# Patient Record
Sex: FEMALE | Race: WHITE | NOT HISPANIC OR LATINO | Employment: FULL TIME | URBAN - METROPOLITAN AREA
[De-identification: names, ages, dates, MRNs, and addresses within clinical notes are randomized per-mention and may not be internally consistent; named-entity substitution may affect disease eponyms.]

---

## 2024-03-17 PROBLEM — E78.49 OTHER HYPERLIPIDEMIA: Status: ACTIVE | Noted: 2024-03-17

## 2024-03-17 PROBLEM — E03.8 SUBCLINICAL HYPOTHYROIDISM: Status: ACTIVE | Noted: 2024-03-17

## 2024-03-18 ENCOUNTER — LAB (OUTPATIENT)
Dept: LAB | Facility: LAB | Age: 59
End: 2024-03-18
Payer: COMMERCIAL

## 2024-03-18 ENCOUNTER — OFFICE VISIT (OUTPATIENT)
Dept: PRIMARY CARE | Facility: CLINIC | Age: 59
End: 2024-03-18
Payer: COMMERCIAL

## 2024-03-18 VITALS
DIASTOLIC BLOOD PRESSURE: 80 MMHG | SYSTOLIC BLOOD PRESSURE: 128 MMHG | OXYGEN SATURATION: 98 % | WEIGHT: 226.2 LBS | HEIGHT: 63 IN | BODY MASS INDEX: 40.08 KG/M2 | HEART RATE: 59 BPM

## 2024-03-18 DIAGNOSIS — E78.49 OTHER HYPERLIPIDEMIA: ICD-10-CM

## 2024-03-18 DIAGNOSIS — E03.8 SUBCLINICAL HYPOTHYROIDISM: ICD-10-CM

## 2024-03-18 DIAGNOSIS — Z00.00 WELL ADULT EXAM: Primary | ICD-10-CM

## 2024-03-18 DIAGNOSIS — Z12.31 OTHER SCREENING MAMMOGRAM: ICD-10-CM

## 2024-03-18 DIAGNOSIS — Z12.31 ENCOUNTER FOR SCREENING MAMMOGRAM FOR BREAST CANCER: ICD-10-CM

## 2024-03-18 DIAGNOSIS — Z00.00 WELL ADULT EXAM: ICD-10-CM

## 2024-03-18 DIAGNOSIS — R73.01 IFG (IMPAIRED FASTING GLUCOSE): Primary | ICD-10-CM

## 2024-03-18 LAB
ALBUMIN SERPL-MCNC: 4.4 G/DL (ref 3.5–5)
ALP BLD-CCNC: 89 U/L (ref 35–125)
ALT SERPL-CCNC: 13 U/L (ref 5–40)
ANION GAP SERPL CALC-SCNC: 11 MMOL/L
APPEARANCE UR: CLEAR
AST SERPL-CCNC: 17 U/L (ref 5–40)
BACTERIA #/AREA URNS AUTO: ABNORMAL /HPF
BASOPHILS # BLD AUTO: 0.06 X10*3/UL (ref 0–0.1)
BASOPHILS NFR BLD AUTO: 0.9 %
BILIRUB SERPL-MCNC: 0.4 MG/DL (ref 0.1–1.2)
BILIRUB UR STRIP.AUTO-MCNC: NEGATIVE MG/DL
BUN SERPL-MCNC: 24 MG/DL (ref 8–25)
CALCIUM SERPL-MCNC: 9.4 MG/DL (ref 8.5–10.4)
CAOX CRY #/AREA UR COMP ASSIST: ABNORMAL /HPF
CHLORIDE SERPL-SCNC: 103 MMOL/L (ref 97–107)
CHOLEST SERPL-MCNC: 208 MG/DL (ref 133–200)
CHOLEST/HDLC SERPL: 2.4 {RATIO}
CO2 SERPL-SCNC: 27 MMOL/L (ref 24–31)
COLOR UR: NORMAL
CREAT SERPL-MCNC: 0.8 MG/DL (ref 0.4–1.6)
EGFRCR SERPLBLD CKD-EPI 2021: 87 ML/MIN/1.73M*2
EOSINOPHIL # BLD AUTO: 0.15 X10*3/UL (ref 0–0.7)
EOSINOPHIL NFR BLD AUTO: 2.3 %
ERYTHROCYTE [DISTWIDTH] IN BLOOD BY AUTOMATED COUNT: 12.8 % (ref 11.5–14.5)
GLUCOSE SERPL-MCNC: 101 MG/DL (ref 65–99)
GLUCOSE UR STRIP.AUTO-MCNC: NORMAL MG/DL
HCT VFR BLD AUTO: 44.2 % (ref 36–46)
HDLC SERPL-MCNC: 86 MG/DL
HGB BLD-MCNC: 14 G/DL (ref 12–16)
IMM GRANULOCYTES # BLD AUTO: 0.01 X10*3/UL (ref 0–0.7)
IMM GRANULOCYTES NFR BLD AUTO: 0.2 % (ref 0–0.9)
KETONES UR STRIP.AUTO-MCNC: NEGATIVE MG/DL
LDLC SERPL CALC-MCNC: 110 MG/DL (ref 65–130)
LEUKOCYTE ESTERASE UR QL STRIP.AUTO: NEGATIVE
LYMPHOCYTES # BLD AUTO: 2.14 X10*3/UL (ref 1.2–4.8)
LYMPHOCYTES NFR BLD AUTO: 32.5 %
MCH RBC QN AUTO: 29.3 PG (ref 26–34)
MCHC RBC AUTO-ENTMCNC: 31.7 G/DL (ref 32–36)
MCV RBC AUTO: 93 FL (ref 80–100)
MONOCYTES # BLD AUTO: 0.54 X10*3/UL (ref 0.1–1)
MONOCYTES NFR BLD AUTO: 8.2 %
MUCOUS THREADS #/AREA URNS AUTO: ABNORMAL /LPF
NEUTROPHILS # BLD AUTO: 3.69 X10*3/UL (ref 1.2–7.7)
NEUTROPHILS NFR BLD AUTO: 55.9 %
NITRITE UR QL STRIP.AUTO: NEGATIVE
NRBC BLD-RTO: 0 /100 WBCS (ref 0–0)
PH UR STRIP.AUTO: 7 [PH]
PLATELET # BLD AUTO: 261 X10*3/UL (ref 150–450)
POTASSIUM SERPL-SCNC: 4.3 MMOL/L (ref 3.4–5.1)
PROT SERPL-MCNC: 6.9 G/DL (ref 5.9–7.9)
PROT UR STRIP.AUTO-MCNC: NORMAL MG/DL
RBC # BLD AUTO: 4.78 X10*6/UL (ref 4–5.2)
RBC # UR STRIP.AUTO: NEGATIVE /UL
RBC #/AREA URNS AUTO: ABNORMAL /HPF
SODIUM SERPL-SCNC: 141 MMOL/L (ref 133–145)
SP GR UR STRIP.AUTO: 1.03
SQUAMOUS #/AREA URNS AUTO: ABNORMAL /HPF
THYROPEROXIDASE AB SERPL-ACNC: 40 IU/ML
TRIGL SERPL-MCNC: 61 MG/DL (ref 40–150)
TSH SERPL DL<=0.05 MIU/L-ACNC: 4.03 MIU/L (ref 0.27–4.2)
UROBILINOGEN UR STRIP.AUTO-MCNC: NORMAL MG/DL
WBC # BLD AUTO: 6.6 X10*3/UL (ref 4.4–11.3)
WBC #/AREA URNS AUTO: ABNORMAL /HPF

## 2024-03-18 PROCEDURE — 36415 COLL VENOUS BLD VENIPUNCTURE: CPT

## 2024-03-18 PROCEDURE — 83036 HEMOGLOBIN GLYCOSYLATED A1C: CPT

## 2024-03-18 PROCEDURE — 81001 URINALYSIS AUTO W/SCOPE: CPT

## 2024-03-18 PROCEDURE — 99386 PREV VISIT NEW AGE 40-64: CPT | Performed by: FAMILY MEDICINE

## 2024-03-18 PROCEDURE — 84443 ASSAY THYROID STIM HORMONE: CPT

## 2024-03-18 PROCEDURE — 1036F TOBACCO NON-USER: CPT | Performed by: FAMILY MEDICINE

## 2024-03-18 PROCEDURE — 90471 IMMUNIZATION ADMIN: CPT | Performed by: FAMILY MEDICINE

## 2024-03-18 PROCEDURE — 90715 TDAP VACCINE 7 YRS/> IM: CPT | Performed by: FAMILY MEDICINE

## 2024-03-18 PROCEDURE — 80061 LIPID PANEL: CPT

## 2024-03-18 PROCEDURE — 3008F BODY MASS INDEX DOCD: CPT | Performed by: FAMILY MEDICINE

## 2024-03-18 PROCEDURE — 80053 COMPREHEN METABOLIC PANEL: CPT

## 2024-03-18 PROCEDURE — 86376 MICROSOMAL ANTIBODY EACH: CPT

## 2024-03-18 PROCEDURE — 85025 COMPLETE CBC W/AUTO DIFF WBC: CPT

## 2024-03-18 ASSESSMENT — ENCOUNTER SYMPTOMS
COUGH: 0
NAUSEA: 0
DYSURIA: 0
UNEXPECTED WEIGHT CHANGE: 0
NERVOUS/ANXIOUS: 0
VOMITING: 0
SHORTNESS OF BREATH: 0
CHILLS: 0
ARTHRALGIAS: 0
WEAKNESS: 0
DYSPHORIC MOOD: 0
MYALGIAS: 0
SORE THROAT: 0
ABDOMINAL PAIN: 0
NUMBNESS: 0
BLOOD IN STOOL: 0
RHINORRHEA: 0
HEMATURIA: 0
DIZZINESS: 0
FEVER: 0
TROUBLE SWALLOWING: 0

## 2024-03-18 ASSESSMENT — PATIENT HEALTH QUESTIONNAIRE - PHQ9
1. LITTLE INTEREST OR PLEASURE IN DOING THINGS: NOT AT ALL
SUM OF ALL RESPONSES TO PHQ9 QUESTIONS 1 AND 2: 0
2. FEELING DOWN, DEPRESSED OR HOPELESS: NOT AT ALL

## 2024-03-18 ASSESSMENT — PAIN SCALES - GENERAL: PAINLEVEL: 0-NO PAIN

## 2024-03-18 NOTE — PROGRESS NOTES
Subjective   Patient ID: Lupe Schmidt is a 58 y.o. female who presents for Establish Care and Weight Loss.    HPI   Lupe is seen for comprehensive physical exam.  PMH, PSH, family history and social history were reviewed and updated.  GYN - Last period was 2020, Pap 2021 negative and negative  Subclinical hypothyroidism -at 1 point she was taking thyroid replacement medication but did not feel any better on it and therefore just stopped.  She has not had her thyroid rechecked in 3 years.  Hypercholesterolemia-she has been eating a clean whole food diet for the past year and a half.  Initially lost 30 pounds but has been unable to lose any more weight.  She is very frustrated.    Review of Systems   Constitutional:  Negative for chills, fever and unexpected weight change.   HENT:  Negative for congestion, ear pain, hearing loss, rhinorrhea, sore throat and trouble swallowing.    Eyes:  Negative for visual disturbance.   Respiratory:  Negative for cough and shortness of breath.    Cardiovascular:  Negative for chest pain and leg swelling.   Gastrointestinal:  Negative for abdominal pain, blood in stool, nausea and vomiting.   Genitourinary:  Negative for dysuria, hematuria, vaginal bleeding and vaginal discharge.   Musculoskeletal:  Negative for arthralgias and myalgias.   Skin:  Negative for rash.   Neurological:  Negative for dizziness, weakness and numbness.   Psychiatric/Behavioral:  Negative for dysphoric mood. The patient is not nervous/anxious.        Objective   /80   Pulse 59   Wt 103 kg (226 lb 3.2 oz)   SpO2 98%   BMI 40.07 kg/m²     Physical Exam  Vitals and nursing note reviewed.   Constitutional:       General: She is not in acute distress.  HENT:      Right Ear: Tympanic membrane and ear canal normal.      Left Ear: Tympanic membrane and ear canal normal.      Nose: Nose normal.      Mouth/Throat:      Mouth: Mucous membranes are moist.   Eyes:      Extraocular Movements: Extraocular  movements intact.      Pupils: Pupils are equal, round, and reactive to light.   Neck:      Vascular: No carotid bruit.   Cardiovascular:      Rate and Rhythm: Normal rate and regular rhythm.   Pulmonary:      Effort: Pulmonary effort is normal.      Breath sounds: Normal breath sounds.   Chest:   Breasts:     Right: Normal. No mass or nipple discharge.      Left: Normal. No mass or nipple discharge.   Abdominal:      General: Abdomen is flat.      Palpations: Abdomen is soft.      Tenderness: There is no abdominal tenderness.   Musculoskeletal:         General: Normal range of motion.   Lymphadenopathy:      Cervical: No cervical adenopathy.      Upper Body:      Right upper body: No supraclavicular or axillary adenopathy.      Left upper body: No supraclavicular or axillary adenopathy.   Skin:     General: Skin is warm.      Findings: No rash.   Neurological:      General: No focal deficit present.      Mental Status: She is alert.   Psychiatric:         Mood and Affect: Mood normal.         Assessment/Plan   Problem List Items Addressed This Visit             ICD-10-CM    Subclinical hypothyroidism - unclear etiology and thus will check antibody as well as thyroid function E03.8    Relevant Orders    TSH with reflex to Free T4 if abnormal    Thyroid Peroxidase (TPO) Antibody    Other hyperlipidemia - continue with diet and exercise E78.49    Relevant Orders    Lipid Panel     Other Visit Diagnoses         Codes    Well adult exam    -  Primary  Preventative measures discussed in detail.  Immunizations reviewed and updated.  Reviewed labs orders with patient Z00.00    Relevant Orders    Lipid Panel    Comprehensive Metabolic Panel    CBC and Auto Differential    TSH with reflex to Free T4 if abnormal    Urinalysis with Reflex Microscopic    Thyroid Peroxidase (TPO) Antibody    Encounter for screening mammogram for breast cancer     Z12.31    Relevant Orders    BI mammo bilateral screening tomosynthesis     Z12.31     BMI 40.0-44.9, adult (CMS/HCC)    Indications, benefits and potential side effects of medication discussed in detail.  Continue with dietary changes and exercise Z68.41    Relevant Medications    tirzepatide, weight loss, (Zepbound) 5 mg/0.5 mL injection     Follow up in 1 month to discuss dose adjustment

## 2024-03-19 LAB
EST. AVERAGE GLUCOSE BLD GHB EST-MCNC: 111 MG/DL
HBA1C MFR BLD: 5.5 %

## 2024-03-21 ENCOUNTER — TELEPHONE (OUTPATIENT)
Dept: PRIMARY CARE | Facility: CLINIC | Age: 59
End: 2024-03-21
Payer: COMMERCIAL

## 2024-03-25 ENCOUNTER — TELEPHONE (OUTPATIENT)
Dept: PRIMARY CARE | Facility: CLINIC | Age: 59
End: 2024-03-25

## 2024-03-25 NOTE — TELEPHONE ENCOUNTER
PT called to check on the prior authorization status for Zepbound. PT stated the pharmacy said her insurance wouldn't approve it without her first trying a different medication. Patient doesn't remember the name of the medication.    Patient can be reached at [phone]

## 2024-06-12 PROCEDURE — 88175 CYTOPATH C/V AUTO FLUID REDO: CPT

## 2024-06-12 PROCEDURE — 87624 HPV HI-RISK TYP POOLED RSLT: CPT

## 2024-06-14 ENCOUNTER — HOSPITAL ENCOUNTER (OUTPATIENT)
Dept: RADIOLOGY | Facility: CLINIC | Age: 59
Discharge: HOME | End: 2024-06-14
Payer: COMMERCIAL

## 2024-06-14 ENCOUNTER — LAB REQUISITION (OUTPATIENT)
Dept: LAB | Facility: HOSPITAL | Age: 59
End: 2024-06-14
Payer: COMMERCIAL

## 2024-06-14 DIAGNOSIS — Z12.4 ENCOUNTER FOR SCREENING FOR MALIGNANT NEOPLASM OF CERVIX: ICD-10-CM

## 2024-06-14 DIAGNOSIS — Z11.51 ENCOUNTER FOR SCREENING FOR HUMAN PAPILLOMAVIRUS (HPV): ICD-10-CM

## 2024-06-14 DIAGNOSIS — Z13.820 ENCOUNTER FOR SCREENING FOR OSTEOPOROSIS: ICD-10-CM

## 2024-06-14 DIAGNOSIS — Z01.419 ENCOUNTER FOR GYNECOLOGICAL EXAMINATION (GENERAL) (ROUTINE) WITHOUT ABNORMAL FINDINGS: ICD-10-CM

## 2024-06-14 PROCEDURE — 77080 DXA BONE DENSITY AXIAL: CPT | Performed by: RADIOLOGY

## 2024-06-14 PROCEDURE — 77080 DXA BONE DENSITY AXIAL: CPT

## 2024-06-15 ENCOUNTER — LAB (OUTPATIENT)
Dept: LAB | Facility: LAB | Age: 59
End: 2024-06-15
Payer: COMMERCIAL

## 2024-06-15 ENCOUNTER — HOSPITAL ENCOUNTER (OUTPATIENT)
Dept: RADIOLOGY | Facility: CLINIC | Age: 59
Discharge: HOME | End: 2024-06-15
Payer: COMMERCIAL

## 2024-06-15 VITALS — BODY MASS INDEX: 39.87 KG/M2 | HEIGHT: 63 IN | WEIGHT: 225 LBS

## 2024-06-15 DIAGNOSIS — N95.1 MENOPAUSAL AND FEMALE CLIMACTERIC STATES: Primary | ICD-10-CM

## 2024-06-15 DIAGNOSIS — Z12.31 ENCOUNTER FOR SCREENING MAMMOGRAM FOR MALIGNANT NEOPLASM OF BREAST: ICD-10-CM

## 2024-06-15 LAB — PROGEST SERPL-MCNC: <0.3 NG/ML

## 2024-06-15 PROCEDURE — 77067 SCR MAMMO BI INCL CAD: CPT | Performed by: RADIOLOGY

## 2024-06-15 PROCEDURE — 82670 ASSAY OF TOTAL ESTRADIOL: CPT

## 2024-06-15 PROCEDURE — 77067 SCR MAMMO BI INCL CAD: CPT

## 2024-06-15 PROCEDURE — 84144 ASSAY OF PROGESTERONE: CPT

## 2024-06-15 PROCEDURE — 82681 ASSAY DIR MEAS FR ESTRADIOL: CPT

## 2024-06-15 PROCEDURE — 82677 ASSAY OF ESTRIOL: CPT

## 2024-06-15 PROCEDURE — 36415 COLL VENOUS BLD VENIPUNCTURE: CPT

## 2024-06-15 PROCEDURE — 77063 BREAST TOMOSYNTHESIS BI: CPT | Performed by: RADIOLOGY

## 2024-06-15 PROCEDURE — 84402 ASSAY OF FREE TESTOSTERONE: CPT

## 2024-06-20 LAB
ESTRIOL SERPL-MCNC: <0.1 NG/ML
TESTOSTERONE FREE (CHAN): 1.6 PG/ML (ref 0.1–6.4)
TESTOSTERONE,TOTAL,LC-MS/MS: 14 NG/DL (ref 2–45)

## 2024-06-24 LAB
ESTRADIOL (SJC): 9 PG/ML
ESTRADIOL FREE: 0.16 PG/ML

## 2024-10-04 ENCOUNTER — APPOINTMENT (OUTPATIENT)
Dept: GASTROENTEROLOGY | Facility: CLINIC | Age: 59
End: 2024-10-04
Payer: COMMERCIAL

## 2024-11-20 ENCOUNTER — TELEPHONE (OUTPATIENT)
Dept: PRIMARY CARE | Facility: CLINIC | Age: 59
End: 2024-11-20
Payer: COMMERCIAL

## 2024-11-20 NOTE — TELEPHONE ENCOUNTER
Patient was told back in March 2024 to start Zepbound for weight loss, her insurance would not cover it so she has not done anything about it. She would like to know if there is another option for weight loss to explore? Would DDC like patient to schedule an appointment to further discuss? Patient declined an appointment , wanted a message sent first to see what DDC suggested.

## 2024-12-18 ENCOUNTER — APPOINTMENT (OUTPATIENT)
Dept: GASTROENTEROLOGY | Facility: CLINIC | Age: 59
End: 2024-12-18
Payer: COMMERCIAL

## 2025-01-17 ENCOUNTER — APPOINTMENT (OUTPATIENT)
Dept: PRIMARY CARE | Facility: CLINIC | Age: 60
End: 2025-01-17
Payer: COMMERCIAL

## 2025-01-17 VITALS
BODY MASS INDEX: 41.99 KG/M2 | OXYGEN SATURATION: 98 % | HEIGHT: 63 IN | SYSTOLIC BLOOD PRESSURE: 124 MMHG | WEIGHT: 237 LBS | DIASTOLIC BLOOD PRESSURE: 82 MMHG | HEART RATE: 74 BPM

## 2025-01-17 DIAGNOSIS — E78.49 OTHER HYPERLIPIDEMIA: ICD-10-CM

## 2025-01-17 PROBLEM — E66.9 OBESITY WITH BODY MASS INDEX 30 OR GREATER: Status: RESOLVED | Noted: 2025-01-17 | Resolved: 2025-01-17

## 2025-01-17 PROCEDURE — 99213 OFFICE O/P EST LOW 20 MIN: CPT | Performed by: FAMILY MEDICINE

## 2025-01-17 PROCEDURE — 3008F BODY MASS INDEX DOCD: CPT | Performed by: FAMILY MEDICINE

## 2025-01-17 RX ORDER — ESTRADIOL 1 MG/1
1 TABLET ORAL
COMMUNITY
Start: 2024-10-16

## 2025-01-17 RX ORDER — PROGESTERONE 100 MG/1
CAPSULE ORAL
COMMUNITY
Start: 2024-10-16

## 2025-01-17 ASSESSMENT — PAIN SCALES - GENERAL: PAINLEVEL_OUTOF10: 0-NO PAIN

## 2025-01-17 ASSESSMENT — PATIENT HEALTH QUESTIONNAIRE - PHQ9
SUM OF ALL RESPONSES TO PHQ9 QUESTIONS 1 AND 2: 0
1. LITTLE INTEREST OR PLEASURE IN DOING THINGS: NOT AT ALL
2. FEELING DOWN, DEPRESSED OR HOPELESS: NOT AT ALL

## 2025-01-17 ASSESSMENT — COLUMBIA-SUICIDE SEVERITY RATING SCALE - C-SSRS: 1. IN THE PAST MONTH, HAVE YOU WISHED YOU WERE DEAD OR WISHED YOU COULD GO TO SLEEP AND NOT WAKE UP?: NO

## 2025-01-17 NOTE — PROGRESS NOTES
"Subjective   Patient ID: Lupe Schmidt is a 59 y.o. female who presents for Weight Gain.    HPI  Lupe presents complaining of obesity and weight gain.  Very frustrated.  Tried to get on Zepbound however it is not covered by her insurance and the cost is a deterrent.  She has been trying to focus on diet and exercise.  Would like to consider other options.    Review of Systems  All other systems have been reviewed and are negative except as noted in the HPI.     Objective   Vital Signs:  /82   Pulse 74   Ht 1.6 m (5' 3\")   Wt 108 kg (237 lb)   SpO2 98%   BMI 41.98 kg/m²   Wt Readings from Last 3 Encounters:   01/17/25 108 kg (237 lb)   06/15/24 102 kg (225 lb)   03/18/24 103 kg (226 lb 3.2 oz)       Physical Exam  Alert.  No acute distress.  Judgment and insight intact.  Assessment & Plan  BMI 40.0-44.9, adult (Multi)  Weight has increased over the past 6 months despite an increase in focus and desire to become healthier.  Given that her insurance does not cover medications recommend referral to bariatric center to discuss other options including gastric sleeve versus gastric bypass.  Strongly encouraged to continue with diet and exercise.  Orders:    Referral to Bariatric Surgery; Future    Other hyperlipidemia  Total cholesterol 208.  Reduction of fatty food intake discussed.  Stressed the importance of weight loss.  Due for recheck in March.  Orders:    Referral to Bariatric Surgery; Future        Follow up 3 Months, sooner with any problems or concerns.  "

## 2025-01-21 NOTE — ASSESSMENT & PLAN NOTE
Total cholesterol 208.  Reduction of fatty food intake discussed.  Stressed the importance of weight loss.  Due for recheck in March.  Orders:    Referral to Bariatric Surgery; Future

## 2025-01-24 DIAGNOSIS — Z98.84 BARIATRIC SURGERY STATUS: ICD-10-CM

## 2025-01-31 ENCOUNTER — APPOINTMENT (OUTPATIENT)
Dept: SURGERY | Facility: CLINIC | Age: 60
End: 2025-01-31
Payer: COMMERCIAL

## 2025-01-31 VITALS — WEIGHT: 237 LBS | BODY MASS INDEX: 41.99 KG/M2 | HEIGHT: 63 IN

## 2025-01-31 NOTE — PROGRESS NOTES
"Initial Bariatric Nutrition Assessment    Surgeon: Asiya   Patient is considering: sleeve gastrectomy    ASSESSMENT:  Current weight:   Vitals:    01/31/25 1409   Weight: 108 kg (237 lb)    Ht:  1.6 m (5' 3\")  BMI: Body mass index is 41.98 kg/m².        Initial start weight in pounds: 237.0  1/31/25  Pre-Op Excess Body Weight (EBW) in pounds: 96.0  Target Post-Op weight goal in pounds: 174.6 - 187.0    This is a 59 y.o. female with morbid obesity (Body mass index is 41.98 kg/m².) who presents to clinic for consideration of bariatric surgery. The patient has attempted and failed multiple diet and exercise regimens for weight loss.   Goal for surgery is to lower weight for overall health.      Food allergies/intolerances: no  Chewing/Swallowing/Dentition: no  Nausea / Vomiting / Hx Gastroparesis: no  Diarrhea/ Constipation: no  Smoking/Tobacco use: no  Vitamins/Minerals supplements: B12 1000 mcg. on own for energy  Hours of sleep/night: 6-7  Exercise: 2-4x/wk for 50 min. mostly resistance and some cardio  Previous Weight loss Attempts: WW, \"Trim Healthy Mama\" and exercise    Medications:     Current Outpatient Medications:     estradiol (Estrace) 1 mg tablet, Take 1 tablet (1 mg) by mouth early in the morning.., Disp: , Rfl:     progesterone (Prometrium) 100 mg capsule, take one capsule by mouth once a day at bedtime, Disp: , Rfl:     24 HOUR RECALL/DIET HISTORY:  Breakfast:  Optimized Whey protein powder 70 cals and 16 grams protein), unsweetened cashew milk, okra, spices  Snack: no   Lunch: salad with baked chicken, beets balsamic dressing  Snack: Built protein bar 130 cals; 4 grams sugar and 17 grams protein  Dinner: grilled chicken sandwich with sauce and cup chicken tortilla soup  Snack: no  Beverages: water, green tea with stevia, diet pop  Alcohol: rarely    Person responsible for cooking & shopping? self  How often do you eat sweet snacks? eating no sugar added varieties daily  How often do you eat savory " snacks? 2x/month  How often do you eat out? 1-3x/wk  Do you feel overly stuffed? occasionally    Binge Eating? no   Night Eating? no  Emotional Eating? boredom     READINESS TO LEARN:  Motivation to learn: Interested        Understanding of instruction: Good  Anticipated Compliance: Good  Family Support: spouse    Educational Materials Provided:    Pre-op Diet and meal plan                                          Nutrition Guidelines for Gastric Bypass and Sleeve Gastrectomy   Schedules for MSWL class and support group   Goals sheet    Nutrition assessment completed today.  Pt will be scheduled for video education class to discuss the 2 week pre op diet, post op protein and fluid goals, vitamin and mineral supplementation, exercise goals, and post op diet progression closer to the time of surgery.    Instructed pt on a 1400 calorie meal plan and encouraged patient  to measure and record intake daily.  Advised to eat 3 meals and 1-2 high protein or produce based snacks daily.   Reviewed postop behaviors to start practicing.   Set a goal to start doing regular exercise.    Patient was receptive to nutritional recommendations, asked numerous questions, and verbalized understanding of the weight loss surgery diet.  Patient expressed understanding about the importance of strict dietary compliance post-surgery to avoid nutritional deficiencies and achieve optimal weight loss and verbalized intent to follow dietary recommendations.    Malnutrition Screening: n/a  Significant unintentional weight loss? n/a   Eating less than 75% of usual intake for more than 2 weeks? n/a      Nutrition Diagnosis:   Overweight/obesity related to excess energy intake as evidenced by BMI >= 40 kg/m^2.  Food- and nutrition-related knowledge deficit related to lack of prior exposure to surgical weight loss information as evidenced by pt new to surgical program.    Nutrition Interventions:   Modify type and amount of food and nutrients within  meals and snacks.  Comprehensive Nutrition Education    Recommendations:  1. Begin following your meal plan.  Measure and record intake daily.   2. Structure meal patterns, eating three meals and 1-2 snacks per day.  3. Aim for 15-30 grams protein per meal.  Have 1-2 high protein snacks that are 10-20 g protein each.  You can try a tuna or chicken packet, Greek yogurt, 2 string cheeses, Protein bars like Quest, Pure Protein, Premier, or Built Bars. you can also try protein chips form Quest or Atkins.    4. Drink 64oz of calorie-free, caffeine-free, and non-carbonated beverages.   5. Practice no drinking 30 minutes before meals, nothing with meals and wait 30 minutes after meals to drink again. Make meals last 30 minutes-chew thoroughly.   6. Limit or omit eating out/sweets/savory snacks to 1-2 times per week.  7. Begin daily multivitamin that is not a gummy multivitamin.  8. Increase physical activity by 10-15 minutes as tolerated to an end goal of 60 minutes 5 x per week. Consistency is the key.  Pre-op Goal weight: lose 5% of body weight    Nutrition Monitoring and Evaluation: 1-2 pound weight loss per week  Criteria: weight check  Need for Follow-up: in 1 month    Patient does meet [employer] guidelines for weight loss surgery, however needs to demonstrate consistent effort in making dietary changes before giving clearance. It is anticipated that the patient will need at least 1 nutritional follow-up visits prior to clearance for surgery.

## 2025-02-27 ENCOUNTER — APPOINTMENT (OUTPATIENT)
Dept: SURGERY | Facility: CLINIC | Age: 60
End: 2025-02-27
Payer: COMMERCIAL

## 2025-02-27 VITALS — BODY MASS INDEX: 41.64 KG/M2 | HEIGHT: 63 IN | WEIGHT: 235 LBS

## 2025-02-27 NOTE — PROGRESS NOTES
"PREOPERATIVE, MULTIDISCIPLINARY, MEDICALLY SUPERVISED, REDUCED CALORIE DIET, BEHAVIOR MODIFICATION AND EXERCISE PROGRAM    S: The patient has been working on modifying her diet.  24 hour food recall: B: shake with whey protein, unsweetened Kefir, unsweetened cashew milk and okra; L: chicken sausage with brussels sprouts; D: hamburger on bun with corned beef, sauerkraut, cheese, 1000 island dressing    O:    Vitals:    02/27/25 1135   Weight: 107 kg (235 lb)    Ht:   1.6 m (5' 3\")     BMI: Body mass index is 41.63 kg/m².    Goal: 5% body weight loss over the course of program    Dietary recommendation:   1. Continue to drink at least 64 ounces of water and calorie free, non-carbonated, and decaffeinated beverages  2. Practice the 30-30-30 rule by drinking between meals.  3. Structure your meal plan - have 3 meals and 1 snack daily.  4. Have balanced meals that always contain a good source of protein.  5. Increase intake of non-starchy vegetables.  Have 5 servings fruits and vegetables daily.   6. Continue to take a multivitamin daily.  7. Increase physical activity by 10-15 minutes to an end goal of 60 minutes 5 x per week.    Group Topic: Portion Control   Behavioral recommendation: Pt is encouraged to identify and use the appropriate serving sizes from each food group.    A/P: Pt appears to have a good understanding of how to incorporate appropriate portion sizes into their daily meal pattern.  Pt has a goal to begin weighing and measuring portions until able to visualize the appropriate portion size.    Exercise: exercising at gym 4x/wk for 50 min; 2x/wk aerobic and 2x/wk resistance exercise    Follow-up in 1 month    Sarah Gonzalez RD, LD  "

## 2025-03-03 ENCOUNTER — APPOINTMENT (OUTPATIENT)
Dept: SLEEP MEDICINE | Facility: CLINIC | Age: 60
End: 2025-03-03
Payer: COMMERCIAL

## 2025-03-03 VITALS
DIASTOLIC BLOOD PRESSURE: 80 MMHG | SYSTOLIC BLOOD PRESSURE: 128 MMHG | HEIGHT: 63 IN | WEIGHT: 236.2 LBS | BODY MASS INDEX: 41.85 KG/M2 | OXYGEN SATURATION: 98 %

## 2025-03-03 DIAGNOSIS — E66.01 MORBID OBESITY (MULTI): ICD-10-CM

## 2025-03-03 DIAGNOSIS — Z01.818 PREOPERATIVE CLEARANCE: ICD-10-CM

## 2025-03-03 DIAGNOSIS — Z98.84 BARIATRIC SURGERY STATUS: Primary | ICD-10-CM

## 2025-03-03 PROCEDURE — 1036F TOBACCO NON-USER: CPT | Performed by: PSYCHIATRY & NEUROLOGY

## 2025-03-03 PROCEDURE — 99204 OFFICE O/P NEW MOD 45 MIN: CPT | Performed by: PSYCHIATRY & NEUROLOGY

## 2025-03-03 PROCEDURE — 3008F BODY MASS INDEX DOCD: CPT | Performed by: PSYCHIATRY & NEUROLOGY

## 2025-03-03 RX ORDER — PROGESTERONE 200 MG/1
200 CAPSULE ORAL DAILY
COMMUNITY
Start: 2024-12-18

## 2025-03-03 NOTE — PROGRESS NOTES
Patient: Lupe Schmidt    [id number]  : 1965 -- AGE 59 y.o.    Provider: Philip Moise MD     [facility]   Service Date: 3/3/2025              [facility]  New Visit Note          The patient's referring provider is: Lavon Livingston MD    HPI:  Lupe Schmidt is a 59 y.o. female with subclinical hypothyroidism, hyperlipidemia, and BMI 40-44.9, who presents today for bariatric surgery clearance.    She has no subjective sleep complaints. States that she snores a little if supine, started a few years ago, mild in intensity, and worsening over the years. States her  she can alternate breathing in sleep with a shallow breath and a normal/bigger breath. She is unsure how often this happens    NIGHTTIME SYMPTOMS:   Snoring: as above  Witnessed apnea: No  Nocturnal gasping: No  Nocturnal choking: No  Sleep walking: No  Sleep talking:  No  Dream enactment: No  Bruxism: No  Nocturnal excessive sweating: occasional - improved with hormone medication  Nocturnal GERD: No  Morning headaches: No  Morning dry mouth/sore throat: No  Nocturia: rare  Restless sleep: No  Sleep paralysis: No  Hypnagogic/hypnopompic hallucinations: No    DAYTIME SYMPTOMS  Daytime sleepiness: generally no  Fatigue: No  Trouble with memory/concentration: No  Feeling sleepy while driving: Denies    RLS symptoms: No.   Cataplexy: No    SLEEP HABITS:   Preferred sleep position: side or supine  Bedtime: on weekdays 10 pm, asleep by 1015 pm, on weekends bedtime is 10-11 pm, asleep within 15 min  Wake time: on weekdays 5 am and on weekends 7 am  Napping: occasional on weekends for 1-2 hours  Total estimated sleep per 24 hrs: 6-8 hours      Patient Active Problem List   Diagnosis    Subclinical hypothyroidism    Other hyperlipidemia    BMI 40.0-44.9, adult (Multi)     Past Medical History:   Diagnosis Date    Obesity with body mass index 30 or greater 2025    Varicella      Past  "Surgical History:   Procedure Laterality Date     SECTION, LOW TRANSVERSE  , ,     COLONOSCOPY      EYE SURGERY  LASIK     WISDOM TOOTH EXTRACTION  1985     Current Outpatient Medications   Medication Sig Dispense Refill    NON FORMULARY DHEA, ESTRADIOL, PROGESTERONE CREAM      progesterone (Prometrium) 200 mg capsule Insert 1 capsule (200 mg) into the vagina once daily.       No current facility-administered medications for this visit.     Allergies   Allergen Reactions    Sulfa (Sulfonamide Antibiotics) Shortness of breath       FAMILY HISTORY OF SLEEP DISORDERS: none that she knows of  Family History   Problem Relation Name Age of Onset    Arthritis Mother DEMETRIA GUARDADO     Alcohol abuse Father AASHISH MONTGOMERY, SR        SOCIAL HISTORY  Employment: works at machine shop  Lives with:  and 26 yo autistic son  Alcohol: rare  Cigarettes: never  Illicits: no  Caffeine: up to 1 per day     ROS: 12 point ROS is negative for all items/systems.    PHYSICAL EXAMINATION:   Vitals:    25 0819   BP: 128/80   BP Location: Left arm   BP Cuff Size: Adult   SpO2: 98%   Weight: 107 kg (236 lb 3.2 oz)   Height: 1.6 m (5' 3\")     Body mass index is 41.84 kg/m².  General: Awake. Alert. Comfortable. No apparent distress.   Speech: Normal  Comprehension: Normal  Mood: Stable  Affect: Appropriate  Eyes:   Eyelids: normal            ENT:          Nares are patent bilaterally. Septum deviation absent. Lisa tongue position I. Tongue scalloping is present, tongue is mildly enlarged, soft palate is not elongated, hard palate is not high arched. Uvula is not enlarged. Retrognathia is not present. Tonsils are not enlarged. Dentition excellent.           Neck:          Circumference: mildly increased in caliber  Cardiac: Regular in rate and rhythm. No murmurs. No edema in bilateral lower extremities.  Pul:         Clear to auscultation bilaterally. Normal respiratory effort   Abd:        mildly " obese  Neuro: Alert, well-oriented. Cranial nerves II-XII grossly normal and symmetric.  Moves all limbs symmetrically with no evidence of significant focal weakness. No abnormal movements noted. Normal gait        LABS/DIAGNOSTICS:  Lab Results   Component Value Date    HGB 14.0 03/18/2024    CO2 27 03/18/2024    TSH 4.03 03/18/2024    FREET4 1.0 01/07/2021    HGBA1C 5.5 03/18/2024        Echo: none  PFTs: none      ASSESSMENT AND PLAN: Ms. Lupe Schmidt is a 59 y.o. female who is going through the bariatric surgery process. She states she does not snore, but sometimes has shallow breathing during sleep that can be followed by a bigger or normal breath. Due to the potential for increased perioperative risks in the bariatric surgery population, testing for sleep apnea and treating if needed prior to surgery is recommended.    #morbid obesity  #preoperative clearance  #bariatric surgery status  -We discussed the risk factors for sleep apnea, pathophysiology of sleep apnea, treatment options, and potential long-term complications of untreated SILVA, including cardiovascular and metabolic complications. We will start evaluation with a home sleep test.   -clearance depends on sleep study results and if CPAP compliance is needed      All of the above was discussed with the patient in detail. She voiced an understanding of the above and was agreeable to proceed further as advised. Procedure for the sleep study was discussed with her.      FOLLOW UP:  After study to discuss results

## 2025-03-07 ENCOUNTER — TELEPHONE (OUTPATIENT)
Dept: SURGERY | Facility: CLINIC | Age: 60
End: 2025-03-07
Payer: COMMERCIAL

## 2025-03-07 NOTE — TELEPHONE ENCOUNTER
Thank you for speaking with me earlier today & confirming your scheduled visit with Dr. Braden on 3/19/25 at 1:00 pm at [facility] (inside [facility], main floor in the [facility]).  Please arrive by 11:45 am to attend the New Patient class.  Parking is available at a cost of $5.00 at the Main Entrance.  We look forward to seeing you!  Polina Geronimo RN, Clinic Nurse

## 2025-03-09 ENCOUNTER — PROCEDURE VISIT (OUTPATIENT)
Dept: SLEEP MEDICINE | Facility: HOSPITAL | Age: 60
End: 2025-03-09
Payer: COMMERCIAL

## 2025-03-09 DIAGNOSIS — Z98.84 BARIATRIC SURGERY STATUS: ICD-10-CM

## 2025-03-09 DIAGNOSIS — E66.01 MORBID OBESITY (MULTI): ICD-10-CM

## 2025-03-09 DIAGNOSIS — Z01.818 PREOPERATIVE CLEARANCE: ICD-10-CM

## 2025-03-09 PROCEDURE — 95806 SLEEP STUDY UNATT&RESP EFFT: CPT | Performed by: INTERNAL MEDICINE

## 2025-03-19 ENCOUNTER — APPOINTMENT (OUTPATIENT)
Dept: SURGERY | Facility: CLINIC | Age: 60
End: 2025-03-19
Payer: COMMERCIAL

## 2025-03-19 VITALS
BODY MASS INDEX: 41.66 KG/M2 | HEART RATE: 89 BPM | SYSTOLIC BLOOD PRESSURE: 154 MMHG | OXYGEN SATURATION: 94 % | WEIGHT: 235.1 LBS | DIASTOLIC BLOOD PRESSURE: 86 MMHG | HEIGHT: 63 IN

## 2025-03-19 DIAGNOSIS — Z98.84 STATUS POST BARIATRIC SURGERY: ICD-10-CM

## 2025-03-19 DIAGNOSIS — E66.01 MORBID OBESITY (MULTI): ICD-10-CM

## 2025-03-19 DIAGNOSIS — G89.29 CHRONIC JOINT PAIN: ICD-10-CM

## 2025-03-19 DIAGNOSIS — Z01.818 PREOPERATIVE CLEARANCE: ICD-10-CM

## 2025-03-19 DIAGNOSIS — E78.49 OTHER HYPERLIPIDEMIA: ICD-10-CM

## 2025-03-19 DIAGNOSIS — M25.50 CHRONIC JOINT PAIN: ICD-10-CM

## 2025-03-19 DIAGNOSIS — E03.8 SUBCLINICAL HYPOTHYROIDISM: ICD-10-CM

## 2025-03-19 DIAGNOSIS — Z13.21 ENCOUNTER FOR VITAMIN DEFICIENCY SCREENING: ICD-10-CM

## 2025-03-19 DIAGNOSIS — Z98.84 BARIATRIC SURGERY STATUS: ICD-10-CM

## 2025-03-19 PROCEDURE — 99204 OFFICE O/P NEW MOD 45 MIN: CPT | Performed by: SURGERY

## 2025-03-19 PROCEDURE — 3008F BODY MASS INDEX DOCD: CPT | Performed by: SURGERY

## 2025-03-19 PROCEDURE — 1036F TOBACCO NON-USER: CPT | Performed by: SURGERY

## 2025-03-19 NOTE — PROGRESS NOTES
BARIATRIC SURGERY NEW PATIENT CONSULTATION  HISTORY AND PHYSICAL      Date: 3/19/2025 Time: 1:29 PM  Name: Lupe Schmidt  MRN: [id number]    This is a 59 y.o. female with morbid obesity (Body mass index is 41.65 kg/m².) who presents to clinic for consideration of bariatric surgery. she has attempted and failed multiple diet and exercise regimens for weight loss.     PMH:   Other hyperlipidemia  No meds.    Subclinical hypothyroidism  No meds currently.    Chronic joint pain  Worse when she eats sugar and processed carbs    She is awaiting results from her home PSG.    PSH:  x3.    Denies smoking/vaping/regular EtOH/drug use.     No personal/family hx of VTE.    The risks of sleeve gastrectomy, Ace-en-Y gastric bypass, and duodenal switch surgery including bleeding, leak, wound infection, dehydration, ulcers, internal hernia, DVT/PE, prolonged nausea/vomiting, incomplete resolution of associated medical conditions, reflux, weight regain, vitamin/mineral deficiencies, and death have been explained to the patient and Lupe Schmidt has expressed understanding and acceptance of them.     The increased risk of substance and alcohol abuse following bariatric surgery was discussed with the patient, along with the negative consequences of substance/alcohol use after surgery including addiction, worsening of mental health disorders, and injury to the stomach. The risk of smoking and vaping (tobacco or any other substance) after bariatric surgery was explained to the patient. This includes risk of anastamotic ulcers, gastritis, bleeding, perforation, stricture, and PO intolerance.  The patient expressed understanding and acceptance of these risks.    The patient was advised not to become pregnant within 12-18 months following bariatric surgery. She was educated on the increased risks to mother and fetus associated with pregnancy within 2 years of bariatric surgery. She is post-menopausal.    The benefits of the  above surgeries including weight loss, improvement/resolution of associated medical and mental health conditions, improved mobility, and decreased mortality have been explained the the patient and Lupe Schmidt has expressed understanding and acceptance of them.    PAST MEDICAL HISTORY:  Problem List Items Addressed This Visit       Subclinical hypothyroidism    Current Assessment & Plan     No meds currently.         Relevant Orders    Referral to Nutrition Services    Referral to Cardiology    Referral to Psychology    Esophagogastroduodenoscopy (EGD)    Thyroxine, Free    Thyroid Stimulating Hormone    Parathyroid Hormone, Intact    Lipid Panel    Iron and TIBC    Hemoglobin A1C    Ferritin    Comprehensive Metabolic Panel    CBC and Auto Differential    Other hyperlipidemia    Current Assessment & Plan     No meds.         Relevant Orders    Referral to Nutrition Services    Referral to Cardiology    Referral to Psychology    Esophagogastroduodenoscopy (EGD)    Thyroxine, Free    Thyroid Stimulating Hormone    Parathyroid Hormone, Intact    Lipid Panel    Iron and TIBC    Hemoglobin A1C    Ferritin    Comprehensive Metabolic Panel    CBC and Auto Differential    BMI 40.0-44.9, adult (Multi) - Primary    Relevant Orders    Referral to Nutrition Services    Referral to Cardiology    Referral to Psychology    Esophagogastroduodenoscopy (EGD)    Thyroxine, Free    Thyroid Stimulating Hormone    Parathyroid Hormone, Intact    Lipid Panel    Iron and TIBC    Hemoglobin A1C    Ferritin    Comprehensive Metabolic Panel    CBC and Auto Differential    Bariatric surgery status    Relevant Orders    Zinc, Serum or Plasma    Vitamin D 25-Hydroxy,Total (for eval of Vitamin D levels)    Vitamin B12    Vitamin B1, Whole Blood    Vitamin A    Nicotine and Metabolites,S    Folate    Drug Screen, Urine With Reflex to Confirmation    C-Peptide    Copper, Blood    Coagulation Screen    Encounter for vitamin deficiency screening     Relevant Orders    Referral to Nutrition Services    Referral to Cardiology    Referral to Psychology    Esophagogastroduodenoscopy (EGD)    Zinc, Serum or Plasma    Vitamin D 25-Hydroxy,Total (for eval of Vitamin D levels)    Vitamin B12    Vitamin B1, Whole Blood    Vitamin A    Thyroxine, Free    Thyroid Stimulating Hormone    Parathyroid Hormone, Intact    Lipid Panel    Iron and TIBC    Hemoglobin A1C    Folate    Ferritin    Copper, Blood    Comprehensive Metabolic Panel    CBC and Auto Differential    Morbid obesity (Multi)    Relevant Orders    Nicotine and Metabolites,S    Drug Screen, Urine With Reflex to Confirmation    C-Peptide    Coagulation Screen    Chronic joint pain    Current Assessment & Plan     Worse when she eats sugar and processed carbs         Relevant Orders    Referral to Nutrition Services    Referral to Cardiology    Referral to Psychology    Esophagogastroduodenoscopy (EGD)    Thyroxine, Free    Thyroid Stimulating Hormone    Parathyroid Hormone, Intact    Lipid Panel    Iron and TIBC    Hemoglobin A1C    Ferritin    Comprehensive Metabolic Panel    CBC and Auto Differential     Other Visit Diagnoses       Status post bariatric surgery        Relevant Orders    Zinc, Serum or Plasma    Vitamin D 25-Hydroxy,Total (for eval of Vitamin D levels)    Vitamin B12    Vitamin B1, Whole Blood    Vitamin A    Folate    Copper, Blood    Preoperative clearance        Relevant Orders    Nicotine and Metabolites,S    Drug Screen, Urine With Reflex to Confirmation    C-Peptide    Coagulation Screen              PAST SURGICAL HISTORY:  Past Surgical History:   Procedure Laterality Date     SECTION, LOW TRANSVERSE  , ,     COLONOSCOPY      EYE SURGERY  LASIK     WISDOM TOOTH EXTRACTION  1985       FAMILY HISTORY:  Family History   Problem Relation Name Age of Onset    Arthritis Mother DEMETRIA GONZALESCI     Alcohol abuse Father AASHISH MONTGOMERY,          SOCIAL HISTORY:  Social  History     Tobacco Use    Smoking status: Never    Smokeless tobacco: Never    Tobacco comments:     3/7/25: Patient stated never smoked. 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       No current facility-administered medications for this visit.       ALLERGIES:  Allergies   Allergen Reactions    Sulfa (Sulfonamide Antibiotics) Shortness of breath       REVIEW OF SYSTEMS:  GENERAL: Negative for malaise, significant weight loss and fever  HEAD: Negative for headache, swelling.  NECK: Negative for lumps, goiter, pain and significant neck swelling  RESPIRATORY: Negative for cough, wheezing or shortness of breath.  CARDIOVASCULAR: Negative for chest pain, leg swelling or palpitations.  GI: Negative for abdominal discomfort, blood in stools or black stools or change in bowel habits  : No history of dysuria, frequency or incontinence  MUSCULOSKELETAL: Negative for joint pain or swelling, back pain or muscle pain.  SKIN: Negative for lesions, rash, and itching.  PSYCH: Negative for sleep disturbance, mood disorder and recent psychosocial stressors.  ENDOCRINE: Negative for cold or heat intolerance, polyuria, polydipsia and goiter.    PHYSICAL EXAM:  Vitals:    03/19/25 1254   BP: 154/86   Pulse: 89   SpO2: 94%     General appearance: obese, NAD  Neuro: AOx3  Head: EOMI; no swelling or lesions of scalp or face  ENT:  no lumps or lymphadenopathy, thyroid normal to palpation; oropharynx clear, no swelling or  erythema  Skin: warm, no erythema or rashes  Lungs: clear to percussion and auscultation  Heart: regular rhythm and S1, S2 normal  Abdomen: soft, non-tender, no masses, no organomegaly  Extremities: Normal exam of the extremities. No swelling or pain.  Psych: no hurried speech, no flight of ideas, normal affect    IMPRESSION:  Lupe Schmidt is a 59 y.o. female with the following diagnosis and co-morbidities:  Body mass index is 41.65 kg/m².   Patient Active Problem List   Diagnosis    Subclinical hypothyroidism    Other hyperlipidemia    BMI 40.0-44.9, adult (Multi)    Bariatric surgery status    Encounter for vitamin deficiency screening    Pre-operative clearance    Morbid obesity (Multi)    Chronic joint pain     Diagnosis noted as above, no additional diagnosis at this time.  This patient does meet the criteria for a surgical weight loss procedure according to NIH guidelines.    PLAN:  The plan of treatment for Lupe Schmidt is to continue with the consultations and tests ordered today in hopes of qualifying for pre-operative clearance for bariatric surgery. This includes:    Consult Nutrition for education and 0 mo SWL  Consult Psychology  Consult cardiology  Labs ordered  EGD  PCP for medical optimization  Consult sleep medicine - concern for SILVA  Counseled on preop weight loss goal of at least 10 lbs    Patient is interested in: sleeve gastrectomy    45 minutes were spent with patient including history, physical exam, and education.    José Braden MD  Bariatric and Minimally Invasive General Surgery

## 2025-03-26 ENCOUNTER — TELEPHONE (OUTPATIENT)
Dept: SLEEP MEDICINE | Facility: CLINIC | Age: 60
End: 2025-03-26

## 2025-03-26 ENCOUNTER — APPOINTMENT (OUTPATIENT)
Dept: SURGERY | Facility: CLINIC | Age: 60
End: 2025-03-26
Payer: COMMERCIAL

## 2025-03-26 VITALS — BODY MASS INDEX: 41.11 KG/M2 | WEIGHT: 232 LBS | HEIGHT: 63 IN

## 2025-03-26 NOTE — PROGRESS NOTES
"PREOPERATIVE, MULTIDISCIPLINARY, MEDICALLY SUPERVISED, REDUCED CALORIE DIET, BEHAVIOR MODIFICATION AND EXERCISE PROGRAM    S:  The patient is working on modifying her diet.  24 hour food recall: plain Kefir mixed with Trim Healthy mama protein powder; salad with skinless chicken, light dressing and orange; s: Built protein bar; D: chicken mixed with light cream cheese, cream of chicken soup and vegetables; s: sugar-free brownie    O:    Vitals:    03/26/25 1339   Weight: 105 kg (232 lb)     Ht:   1.6 m (5' 3\")    BMI: Body mass index is 41.1 kg/m².    Goal: 5% body weight loss over the course of program    Dietary recommendation:   1. Continue to drink at least 64 ounces of water and calorie free, non-carbonated, and decaffeinated beverages  2. Practice the 30-30-30 rule by drinking between meals.  3. Structure your meal plan - have 3 meals and 1 snack daily.  4. Have balanced meals that always contain a good source of protein.  5. Increase intake of non-starchy vegetables.  Have 5 servings fruits and vegetables daily.   6. Continue to take a multivitamin daily.  7. Increase physical activity by 10-15 minutes to an end goal of 60 minutes 5 x per week.    Group Topic: Going Lean With Protein  Behavioral recommendation: Pt is encouraged to identify and recall sources of lean protein.    A/P: Pt appears to have a good understanding of how to incorporate lean protein into their daily meal pattern.  Pt has a goal to add a lean protein source to each meal.    Exercise: 3-5x/wk for 50 min. resistance exercise    The patient is scheduled for the [facility] zoom on 4/16/25 and will follow-up in 1 month  Sarah Gonzalez, MAICO, LD  "

## 2025-03-26 NOTE — TELEPHONE ENCOUNTER
Patient called in  asking for status update on her surg clearance. Had sleep study done on 3/9 ands sent it to us in the mail.     I have not seen anything come through

## 2025-03-27 ENCOUNTER — APPOINTMENT (OUTPATIENT)
Dept: SURGERY | Facility: CLINIC | Age: 60
End: 2025-03-27
Payer: COMMERCIAL

## 2025-03-30 LAB
25(OH)D3+25(OH)D2 SERPL-MCNC: 34 NG/ML (ref 30–100)
ALBUMIN SERPL-MCNC: 4.1 G/DL (ref 3.6–5.1)
ALP SERPL-CCNC: 52 U/L (ref 37–153)
ALT SERPL-CCNC: 12 U/L (ref 6–29)
ANION GAP SERPL CALCULATED.4IONS-SCNC: 9 MMOL/L (CALC) (ref 7–17)
APTT PPP: 31 SEC (ref 23–32)
AST SERPL-CCNC: 19 U/L (ref 10–35)
BASOPHILS # BLD AUTO: 67 CELLS/UL (ref 0–200)
BASOPHILS NFR BLD AUTO: 0.7 %
BILIRUB SERPL-MCNC: 0.6 MG/DL (ref 0.2–1.2)
BUN SERPL-MCNC: 22 MG/DL (ref 7–25)
C PEPTIDE SERPL-MCNC: 5.15 NG/ML (ref 0.8–3.85)
CALCIUM SERPL-MCNC: 8.9 MG/DL (ref 8.6–10.4)
CHLORIDE SERPL-SCNC: 107 MMOL/L (ref 98–110)
CHOLEST SERPL-MCNC: 175 MG/DL
CHOLEST/HDLC SERPL: 2.9 (CALC)
CO2 SERPL-SCNC: 22 MMOL/L (ref 20–32)
COPPER BLD-MCNC: NORMAL UG/DL
COTININE SERPL-MCNC: NORMAL NG/ML
CREAT SERPL-MCNC: 0.87 MG/DL (ref 0.5–1.03)
EGFRCR SERPLBLD CKD-EPI 2021: 78 ML/MIN/1.73M2
EOSINOPHIL # BLD AUTO: 76 CELLS/UL (ref 15–500)
EOSINOPHIL NFR BLD AUTO: 0.8 %
ERYTHROCYTE [DISTWIDTH] IN BLOOD BY AUTOMATED COUNT: 13 % (ref 11–15)
EST. AVERAGE GLUCOSE BLD GHB EST-MCNC: 105 MG/DL
EST. AVERAGE GLUCOSE BLD GHB EST-SCNC: 5.8 MMOL/L
FERRITIN SERPL-MCNC: 25 NG/ML (ref 16–232)
FOLATE SERPL-MCNC: 13 NG/ML
GLUCOSE SERPL-MCNC: 110 MG/DL (ref 65–99)
HBA1C MFR BLD: 5.3 % OF TOTAL HGB
HCT VFR BLD AUTO: 42.7 % (ref 35–45)
HDLC SERPL-MCNC: 60 MG/DL
HGB BLD-MCNC: 13.9 G/DL (ref 11.7–15.5)
INR PPP: 1
IRON SATN MFR SERPL: 29 % (CALC) (ref 16–45)
IRON SERPL-MCNC: 95 MCG/DL (ref 45–160)
LDLC SERPL CALC-MCNC: 94 MG/DL (CALC)
LYMPHOCYTES # BLD AUTO: 1416 CELLS/UL (ref 850–3900)
LYMPHOCYTES NFR BLD AUTO: 14.9 %
MCH RBC QN AUTO: 30.9 PG (ref 27–33)
MCHC RBC AUTO-ENTMCNC: 32.6 G/DL (ref 32–36)
MCV RBC AUTO: 94.9 FL (ref 80–100)
MONOCYTES # BLD AUTO: 428 CELLS/UL (ref 200–950)
MONOCYTES NFR BLD AUTO: 4.5 %
NEUTROPHILS # BLD AUTO: 7515 CELLS/UL (ref 1500–7800)
NEUTROPHILS NFR BLD AUTO: 79.1 %
NICOTINE SERPL-MCNC: NORMAL NG/ML
NONHDLC SERPL-MCNC: 115 MG/DL (CALC)
PLATELET # BLD AUTO: 264 THOUSAND/UL (ref 140–400)
PMV BLD REES-ECKER: 11.3 FL (ref 7.5–12.5)
POTASSIUM SERPL-SCNC: 4.5 MMOL/L (ref 3.5–5.3)
PROT SERPL-MCNC: 6.6 G/DL (ref 6.1–8.1)
PROTHROMBIN TIME: 10.9 SEC (ref 9–11.5)
PTH-INTACT SERPL-MCNC: NORMAL PG/ML
RBC # BLD AUTO: 4.5 MILLION/UL (ref 3.8–5.1)
SODIUM SERPL-SCNC: 138 MMOL/L (ref 135–146)
T4 FREE SERPL-MCNC: 0.9 NG/DL (ref 0.8–1.8)
TIBC SERPL-MCNC: 324 MCG/DL (CALC) (ref 250–450)
TRIGL SERPL-MCNC: 114 MG/DL
TSH SERPL-ACNC: 2.81 MIU/L (ref 0.4–4.5)
VIT A SERPL-MCNC: NORMAL UG/ML
VIT B1 BLD-SCNC: NORMAL NMOL/L
VIT B12 SERPL-MCNC: 489 PG/ML (ref 200–1100)
WBC # BLD AUTO: 9.5 THOUSAND/UL (ref 3.8–10.8)
ZINC SERPL-MCNC: NORMAL UG/ML

## 2025-04-01 ENCOUNTER — OFFICE VISIT (OUTPATIENT)
Dept: CARDIOLOGY | Facility: CLINIC | Age: 60
End: 2025-04-01
Payer: COMMERCIAL

## 2025-04-01 VITALS
SYSTOLIC BLOOD PRESSURE: 123 MMHG | HEART RATE: 86 BPM | OXYGEN SATURATION: 96 % | DIASTOLIC BLOOD PRESSURE: 86 MMHG | HEIGHT: 63 IN | BODY MASS INDEX: 41.69 KG/M2 | WEIGHT: 235.3 LBS

## 2025-04-01 DIAGNOSIS — Z01.818 PRE-OPERATIVE CLEARANCE: Primary | ICD-10-CM

## 2025-04-01 PROCEDURE — 93005 ELECTROCARDIOGRAM TRACING: CPT | Performed by: HOSPITALIST

## 2025-04-01 PROCEDURE — 99213 OFFICE O/P EST LOW 20 MIN: CPT | Performed by: HOSPITALIST

## 2025-04-01 PROCEDURE — 99203 OFFICE O/P NEW LOW 30 MIN: CPT | Performed by: HOSPITALIST

## 2025-04-01 PROCEDURE — 93010 ELECTROCARDIOGRAM REPORT: CPT | Performed by: STUDENT IN AN ORGANIZED HEALTH CARE EDUCATION/TRAINING PROGRAM

## 2025-04-01 PROCEDURE — 1036F TOBACCO NON-USER: CPT | Performed by: HOSPITALIST

## 2025-04-01 PROCEDURE — 3008F BODY MASS INDEX DOCD: CPT | Performed by: HOSPITALIST

## 2025-04-01 ASSESSMENT — PATIENT HEALTH QUESTIONNAIRE - PHQ9
2. FEELING DOWN, DEPRESSED OR HOPELESS: NOT AT ALL
SUM OF ALL RESPONSES TO PHQ9 QUESTIONS 1 AND 2: 0
1. LITTLE INTEREST OR PLEASURE IN DOING THINGS: NOT AT ALL

## 2025-04-01 ASSESSMENT — PAIN SCALES - GENERAL: PAINLEVEL_OUTOF10: 0-NO PAIN

## 2025-04-01 NOTE — PATIENT INSTRUCTIONS
Thank you so much for visiting us today.    You are at low/acceptable cardiac risk for your upcoming surgery.  Please call us at [phone] if any question need help.

## 2025-04-01 NOTE — PROGRESS NOTES
"Kalia Schmidt \"Floyd" is a 59 y.o. female with PMH of obesity [BMI 42], HLD, and joint disease, who is here for cardiac preop risk stratification.  Patient is physically active, goes to gym 3 times a week, does mainly strength training.  She climbs 2 short flights of stairs at her house without any cardiovascular symptoms.  She denies any history of stroke, heart attack, heart failure, CKD, or diabetes.  She never smoked.  No family history of heart disease.      Patient not on any cardiac medications.  Today's blood pressure is 123/86, heart rate 86.    Most recent blood work from 3/29/2025 with creatinine of 0.87, triglyceride 114, LDL 94, HDL 60, and unremarkable CBC.    EKG from 4/1/25, reviewed by myself, showed NSR, normal EKG.    Review of Systems  ROS is negative other than in HPI.      Objective   Physical Exam  General: NAD  HEENT: IEOM, PERRL   Neck: No JVD or carotid bruit  Lungs: CTAB  Heart: RRR, normal S1 and S2, no loud murmurs  Abdomen: Soft, nontender, positive bowel sounds  Extremities: No edema  Neurologic: No FND  Psychiatric: Normal mood and affect    Assessment/Plan   1-cardiac preop risk stratification:  -No clinical risk factors.  -She has good level of functional capacity with no cardiovascular symptoms.  -Her EKG is normal.  Patient is at acceptable /low cardiac risk for her upcoming bariatric surgery.  No need for further testing.    RTC as needed.    Gualberto Mackenzie MD     "

## 2025-04-02 LAB
ATRIAL RATE: 86 BPM
P AXIS: 13 DEGREES
P OFFSET: 208 MS
P ONSET: 153 MS
PR INTERVAL: 138 MS
Q ONSET: 222 MS
QRS COUNT: 14 BEATS
QRS DURATION: 76 MS
QT INTERVAL: 352 MS
QTC CALCULATION(BAZETT): 421 MS
QTC FREDERICIA: 397 MS
R AXIS: 78 DEGREES
T AXIS: 57 DEGREES
T OFFSET: 398 MS
VENTRICULAR RATE: 86 BPM

## 2025-04-03 ENCOUNTER — APPOINTMENT (OUTPATIENT)
Dept: SLEEP MEDICINE | Facility: CLINIC | Age: 60
End: 2025-04-03
Payer: COMMERCIAL

## 2025-04-03 LAB
DRUGS UR: NORMAL
SERVICE CMNT 02-IMP: NORMAL

## 2025-04-04 LAB
25(OH)D3+25(OH)D2 SERPL-MCNC: 34 NG/ML (ref 30–100)
ALBUMIN SERPL-MCNC: 4.1 G/DL (ref 3.6–5.1)
ALP SERPL-CCNC: 52 U/L (ref 37–153)
ALT SERPL-CCNC: 12 U/L (ref 6–29)
ANION GAP SERPL CALCULATED.4IONS-SCNC: 9 MMOL/L (CALC) (ref 7–17)
APTT PPP: 31 SEC (ref 23–32)
AST SERPL-CCNC: 19 U/L (ref 10–35)
BASOPHILS # BLD AUTO: 67 CELLS/UL (ref 0–200)
BASOPHILS NFR BLD AUTO: 0.7 %
BILIRUB SERPL-MCNC: 0.6 MG/DL (ref 0.2–1.2)
BUN SERPL-MCNC: 22 MG/DL (ref 7–25)
C PEPTIDE SERPL-MCNC: 5.15 NG/ML (ref 0.8–3.85)
CALCIUM SERPL-MCNC: 8.9 MG/DL (ref 8.6–10.4)
CHLORIDE SERPL-SCNC: 107 MMOL/L (ref 98–110)
CHOLEST SERPL-MCNC: 175 MG/DL
CHOLEST/HDLC SERPL: 2.9 (CALC)
CO2 SERPL-SCNC: 22 MMOL/L (ref 20–32)
COPPER BLD-MCNC: 93 MCG/DL
COTININE SERPL-MCNC: <2 NG/ML
CREAT SERPL-MCNC: 0.87 MG/DL (ref 0.5–1.03)
EGFRCR SERPLBLD CKD-EPI 2021: 78 ML/MIN/1.73M2
EOSINOPHIL # BLD AUTO: 76 CELLS/UL (ref 15–500)
EOSINOPHIL NFR BLD AUTO: 0.8 %
ERYTHROCYTE [DISTWIDTH] IN BLOOD BY AUTOMATED COUNT: 13 % (ref 11–15)
EST. AVERAGE GLUCOSE BLD GHB EST-MCNC: 105 MG/DL
EST. AVERAGE GLUCOSE BLD GHB EST-SCNC: 5.8 MMOL/L
FERRITIN SERPL-MCNC: 25 NG/ML (ref 16–232)
FOLATE SERPL-MCNC: 13 NG/ML
GLUCOSE SERPL-MCNC: 110 MG/DL (ref 65–99)
HBA1C MFR BLD: 5.3 % OF TOTAL HGB
HCT VFR BLD AUTO: 42.7 % (ref 35–45)
HDLC SERPL-MCNC: 60 MG/DL
HGB BLD-MCNC: 13.9 G/DL (ref 11.7–15.5)
INR PPP: 1
IRON SATN MFR SERPL: 29 % (CALC) (ref 16–45)
IRON SERPL-MCNC: 95 MCG/DL (ref 45–160)
LDLC SERPL CALC-MCNC: 94 MG/DL (CALC)
LYMPHOCYTES # BLD AUTO: 1416 CELLS/UL (ref 850–3900)
LYMPHOCYTES NFR BLD AUTO: 14.9 %
MCH RBC QN AUTO: 30.9 PG (ref 27–33)
MCHC RBC AUTO-ENTMCNC: 32.6 G/DL (ref 32–36)
MCV RBC AUTO: 94.9 FL (ref 80–100)
MONOCYTES # BLD AUTO: 428 CELLS/UL (ref 200–950)
MONOCYTES NFR BLD AUTO: 4.5 %
NEUTROPHILS # BLD AUTO: 7515 CELLS/UL (ref 1500–7800)
NEUTROPHILS NFR BLD AUTO: 79.1 %
NICOTINE SERPL-MCNC: <2 NG/ML
NONHDLC SERPL-MCNC: 115 MG/DL (CALC)
PLATELET # BLD AUTO: 264 THOUSAND/UL (ref 140–400)
PMV BLD REES-ECKER: 11.3 FL (ref 7.5–12.5)
POTASSIUM SERPL-SCNC: 4.5 MMOL/L (ref 3.5–5.3)
PROT SERPL-MCNC: 6.6 G/DL (ref 6.1–8.1)
PROTHROMBIN TIME: 10.9 SEC (ref 9–11.5)
PTH-INTACT SERPL-MCNC: 55 PG/ML (ref 16–77)
RBC # BLD AUTO: 4.5 MILLION/UL (ref 3.8–5.1)
SODIUM SERPL-SCNC: 138 MMOL/L (ref 135–146)
T4 FREE SERPL-MCNC: 0.9 NG/DL (ref 0.8–1.8)
TIBC SERPL-MCNC: 324 MCG/DL (CALC) (ref 250–450)
TRIGL SERPL-MCNC: 114 MG/DL
TSH SERPL-ACNC: 2.81 MIU/L (ref 0.4–4.5)
VIT A SERPL-MCNC: 29 MCG/DL (ref 38–98)
VIT B1 BLD-SCNC: NORMAL NMOL/L
VIT B12 SERPL-MCNC: 489 PG/ML (ref 200–1100)
WBC # BLD AUTO: 9.5 THOUSAND/UL (ref 3.8–10.8)
ZINC SERPL-MCNC: 48 MCG/DL (ref 60–130)

## 2025-04-07 LAB
25(OH)D3+25(OH)D2 SERPL-MCNC: 34 NG/ML (ref 30–100)
ALBUMIN SERPL-MCNC: 4.1 G/DL (ref 3.6–5.1)
ALP SERPL-CCNC: 52 U/L (ref 37–153)
ALT SERPL-CCNC: 12 U/L (ref 6–29)
ANION GAP SERPL CALCULATED.4IONS-SCNC: 9 MMOL/L (CALC) (ref 7–17)
APTT PPP: 31 SEC (ref 23–32)
AST SERPL-CCNC: 19 U/L (ref 10–35)
BASOPHILS # BLD AUTO: 67 CELLS/UL (ref 0–200)
BASOPHILS NFR BLD AUTO: 0.7 %
BILIRUB SERPL-MCNC: 0.6 MG/DL (ref 0.2–1.2)
BUN SERPL-MCNC: 22 MG/DL (ref 7–25)
C PEPTIDE SERPL-MCNC: 5.15 NG/ML (ref 0.8–3.85)
CALCIUM SERPL-MCNC: 8.9 MG/DL (ref 8.6–10.4)
CHLORIDE SERPL-SCNC: 107 MMOL/L (ref 98–110)
CHOLEST SERPL-MCNC: 175 MG/DL
CHOLEST/HDLC SERPL: 2.9 (CALC)
CO2 SERPL-SCNC: 22 MMOL/L (ref 20–32)
COPPER BLD-MCNC: 93 MCG/DL
COTININE SERPL-MCNC: <2 NG/ML
CREAT SERPL-MCNC: 0.87 MG/DL (ref 0.5–1.03)
EGFRCR SERPLBLD CKD-EPI 2021: 78 ML/MIN/1.73M2
EOSINOPHIL # BLD AUTO: 76 CELLS/UL (ref 15–500)
EOSINOPHIL NFR BLD AUTO: 0.8 %
ERYTHROCYTE [DISTWIDTH] IN BLOOD BY AUTOMATED COUNT: 13 % (ref 11–15)
EST. AVERAGE GLUCOSE BLD GHB EST-MCNC: 105 MG/DL
EST. AVERAGE GLUCOSE BLD GHB EST-SCNC: 5.8 MMOL/L
FERRITIN SERPL-MCNC: 25 NG/ML (ref 16–232)
FOLATE SERPL-MCNC: 13 NG/ML
GLUCOSE SERPL-MCNC: 110 MG/DL (ref 65–99)
HBA1C MFR BLD: 5.3 % OF TOTAL HGB
HCT VFR BLD AUTO: 42.7 % (ref 35–45)
HDLC SERPL-MCNC: 60 MG/DL
HGB BLD-MCNC: 13.9 G/DL (ref 11.7–15.5)
INR PPP: 1
IRON SATN MFR SERPL: 29 % (CALC) (ref 16–45)
IRON SERPL-MCNC: 95 MCG/DL (ref 45–160)
LDLC SERPL CALC-MCNC: 94 MG/DL (CALC)
LYMPHOCYTES # BLD AUTO: 1416 CELLS/UL (ref 850–3900)
LYMPHOCYTES NFR BLD AUTO: 14.9 %
MCH RBC QN AUTO: 30.9 PG (ref 27–33)
MCHC RBC AUTO-ENTMCNC: 32.6 G/DL (ref 32–36)
MCV RBC AUTO: 94.9 FL (ref 80–100)
MONOCYTES # BLD AUTO: 428 CELLS/UL (ref 200–950)
MONOCYTES NFR BLD AUTO: 4.5 %
NEUTROPHILS # BLD AUTO: 7515 CELLS/UL (ref 1500–7800)
NEUTROPHILS NFR BLD AUTO: 79.1 %
NICOTINE SERPL-MCNC: <2 NG/ML
NONHDLC SERPL-MCNC: 115 MG/DL (CALC)
PLATELET # BLD AUTO: 264 THOUSAND/UL (ref 140–400)
PMV BLD REES-ECKER: 11.3 FL (ref 7.5–12.5)
POTASSIUM SERPL-SCNC: 4.5 MMOL/L (ref 3.5–5.3)
PROT SERPL-MCNC: 6.6 G/DL (ref 6.1–8.1)
PROTHROMBIN TIME: 10.9 SEC (ref 9–11.5)
PTH-INTACT SERPL-MCNC: 55 PG/ML (ref 16–77)
RBC # BLD AUTO: 4.5 MILLION/UL (ref 3.8–5.1)
SODIUM SERPL-SCNC: 138 MMOL/L (ref 135–146)
T4 FREE SERPL-MCNC: 0.9 NG/DL (ref 0.8–1.8)
TIBC SERPL-MCNC: 324 MCG/DL (CALC) (ref 250–450)
TRIGL SERPL-MCNC: 114 MG/DL
TSH SERPL-ACNC: 2.81 MIU/L (ref 0.4–4.5)
VIT A SERPL-MCNC: 29 MCG/DL (ref 38–98)
VIT B1 BLD-SCNC: 122 NMOL/L (ref 78–185)
VIT B12 SERPL-MCNC: 489 PG/ML (ref 200–1100)
WBC # BLD AUTO: 9.5 THOUSAND/UL (ref 3.8–10.8)
ZINC SERPL-MCNC: 48 MCG/DL (ref 60–130)

## 2025-04-16 ENCOUNTER — APPOINTMENT (OUTPATIENT)
Dept: SURGERY | Facility: CLINIC | Age: 60
End: 2025-04-16
Payer: COMMERCIAL

## 2025-04-16 NOTE — PROGRESS NOTES
PREOPERATIVE, MULTIDISCIPLINARY, MEDICALLY SUPERVISED, REDUCED CALORIE DIET, BEHAVIOR MODIFICATION AND EXERCISE PROGRAM    S:  The patient attended a 60 minute class today to review the 2 week pre-op diet, post-op eating guidelines and post-op vitamin/mineral supplements.     Weight: no weight was reported today.     Dietary recommendation:   1. Continue to work on eating 60-70 grams protein daily.  2. Continue to drink 64 ounces of water and calorie free, non-carbonated and decaffeinated beverages daily.  3. Continue to practice the 30-30-30 rule by drinking between meals.  4. Continue to take your MVI and discontinue taking your MVI 1 week before surgery.  5. Continue regular exercise with a longer term goal of 5 times weekly for 60 minutes.  5. Start the 2 week pre-op diet 2 weeks before your scheduled surgery date.  6. The day before surgery, drink clear fluids only.  No shakes and no red dyes.  7. The night before surgery and 2 hours prior to surgery drink 12 oz. Gatorade or G2 without red dye, unless specified otherwise in your        pre-admission instructions.    A/P: Pt appears to understand the 2 week pre-op diet.  Reviewed post-op vit/mineral supplements, post-op full liquid and the post-op eating progression for the first 6 weeks post-op.  Encouraged patient to follow-up as scheduled before and after surgery.       Sarah Goznalez RD, LD

## 2025-04-17 ENCOUNTER — DOCUMENTATION (OUTPATIENT)
Dept: SURGERY | Facility: CLINIC | Age: 60
End: 2025-04-17
Payer: COMMERCIAL

## 2025-04-18 ENCOUNTER — TELEPHONE (OUTPATIENT)
Dept: PREADMISSION TESTING | Facility: HOSPITAL | Age: 60
End: 2025-04-18
Payer: COMMERCIAL

## 2025-04-18 NOTE — TELEPHONE ENCOUNTER
Pre-procedure PAT phone assessment completed. Pre-operative and medication instructions reviewed with patient. Pt states last dose of GLP-1 compound was 4/12/25. States she was instructed by Dr Braden to not take any more doses until after this procedure. Patient verbalizes understanding of instructions.  SURGERY PRE-OPERATIVE INSTRUCTIONS    *You will receive a phone call the day before your procedure  after 2pm, (or the Friday before your surgery if scheduled on a Monday.) Generally the hospital will be calling you with this information after that time.    *If you are taking GLP1 medications for type 2 diabetes or weight loss, hold these medications on the day of the procedure. START a clear liquid diet 24 hours before your surgery. On the day of your surgery/procedure, STOP all clear liquids 2 hours before your arrival time to the hospital. A clear liquid diet consists of clear liquids and foods that melt into a clear liquid. Clear liquids can and should contain sugar. This is only if you are taking a GLP1 medication.     *You are not to eat after midnight the night before the surgery. You may have up to 10 ounces of clear liquids up until 2 hours prior to arriving to the hospital. The exception is with medications you were instructed to take day of surgery.     *You may take tylenol for pain/discomfort as needed.     *Stop taking all aspirin products, ibuprofen (motrin/advil), naproxen (aleve/naprosyn) for one week prior to surgery.    *Stop taking all vitamins and supplements one week prior to surgery.     *You should not have alcoholic beverages for 24 hours before surgery.     *You should not smoke 24 hours prior to surgery.     *To help prevent surgical infections bathe/shower with Dial soap the evening before surgery.    *You can wear deodorant but no lotion, powder, or perfume/cologne. You should remove all make-up and nail polish at home.    *If you wear glasses, please bring a case for the glasses with  you.    *You will be asked to remove dentures and contacts.     *Please leave all valuables at home.    *You should wear loose, comfortable clothing that will accommodate bandages and/or casts.    *You should notify your doctor of any change in your condition (fever, cold, rash, etc). Surgery may need to be re-scheduled until a time you are in better health.    *A responsible adult is required to accompany you to and from the hospital if you are receiving anesthesia or a sedative. Patients are not permitted to drive for 24 hours after anesthesia.     *You can use the [employer] if you wish.     *If you have any further questions please call [phone].

## 2025-04-21 ENCOUNTER — ANESTHESIA EVENT (OUTPATIENT)
Dept: OPERATING ROOM | Facility: HOSPITAL | Age: 60
End: 2025-04-21
Payer: COMMERCIAL

## 2025-04-22 ENCOUNTER — APPOINTMENT (OUTPATIENT)
Dept: OPERATING ROOM | Facility: HOSPITAL | Age: 60
End: 2025-04-22
Payer: COMMERCIAL

## 2025-04-22 ENCOUNTER — ANESTHESIA (OUTPATIENT)
Dept: OPERATING ROOM | Facility: HOSPITAL | Age: 60
End: 2025-04-22
Payer: COMMERCIAL

## 2025-04-22 ENCOUNTER — TELEPHONE (OUTPATIENT)
Dept: SURGERY | Facility: CLINIC | Age: 60
End: 2025-04-22

## 2025-04-22 VITALS
HEART RATE: 74 BPM | DIASTOLIC BLOOD PRESSURE: 74 MMHG | OXYGEN SATURATION: 98 % | SYSTOLIC BLOOD PRESSURE: 114 MMHG | WEIGHT: 231.48 LBS | HEIGHT: 63 IN | TEMPERATURE: 98.2 F | RESPIRATION RATE: 16 BRPM | BODY MASS INDEX: 41.02 KG/M2

## 2025-04-22 DIAGNOSIS — E78.49 OTHER HYPERLIPIDEMIA: ICD-10-CM

## 2025-04-22 DIAGNOSIS — G89.29 CHRONIC JOINT PAIN: ICD-10-CM

## 2025-04-22 DIAGNOSIS — E66.01 MORBID OBESITY (MULTI): Primary | ICD-10-CM

## 2025-04-22 DIAGNOSIS — M25.50 CHRONIC JOINT PAIN: ICD-10-CM

## 2025-04-22 DIAGNOSIS — E03.8 SUBCLINICAL HYPOTHYROIDISM: ICD-10-CM

## 2025-04-22 DIAGNOSIS — Z13.21 ENCOUNTER FOR VITAMIN DEFICIENCY SCREENING: ICD-10-CM

## 2025-04-22 DIAGNOSIS — K44.9 HIATAL HERNIA: ICD-10-CM

## 2025-04-22 PROCEDURE — 7100000009 HC PHASE TWO TIME - INITIAL BASE CHARGE: Performed by: ANESTHESIOLOGY

## 2025-04-22 PROCEDURE — 2500000004 HC RX 250 GENERAL PHARMACY W/ HCPCS (ALT 636 FOR OP/ED): Mod: JZ | Performed by: ANESTHESIOLOGY

## 2025-04-22 PROCEDURE — 3700000001 HC GENERAL ANESTHESIA TIME - INITIAL BASE CHARGE: Performed by: ANESTHESIOLOGY

## 2025-04-22 PROCEDURE — A43239 PR EDG TRANSORAL BIOPSY SINGLE/MULTIPLE: Performed by: NURSE ANESTHETIST, CERTIFIED REGISTERED

## 2025-04-22 PROCEDURE — 3600000002 HC OR TIME - INITIAL BASE CHARGE - PROCEDURE LEVEL TWO: Performed by: ANESTHESIOLOGY

## 2025-04-22 PROCEDURE — 3700000002 HC GENERAL ANESTHESIA TIME - EACH INCREMENTAL 1 MINUTE: Performed by: ANESTHESIOLOGY

## 2025-04-22 PROCEDURE — 7100000010 HC PHASE TWO TIME - EACH INCREMENTAL 1 MINUTE: Performed by: ANESTHESIOLOGY

## 2025-04-22 PROCEDURE — 43239 EGD BIOPSY SINGLE/MULTIPLE: CPT | Performed by: SURGERY

## 2025-04-22 PROCEDURE — 2500000004 HC RX 250 GENERAL PHARMACY W/ HCPCS (ALT 636 FOR OP/ED): Mod: JZ | Performed by: NURSE ANESTHETIST, CERTIFIED REGISTERED

## 2025-04-22 PROCEDURE — A43239 PR EDG TRANSORAL BIOPSY SINGLE/MULTIPLE: Performed by: ANESTHESIOLOGY

## 2025-04-22 PROCEDURE — 3600000007 HC OR TIME - EACH INCREMENTAL 1 MINUTE - PROCEDURE LEVEL TWO: Performed by: ANESTHESIOLOGY

## 2025-04-22 RX ORDER — ONDANSETRON HYDROCHLORIDE 2 MG/ML
4 INJECTION, SOLUTION INTRAVENOUS ONCE AS NEEDED
Status: DISCONTINUED | OUTPATIENT
Start: 2025-04-22 | End: 2025-04-23 | Stop reason: HOSPADM

## 2025-04-22 RX ORDER — FENTANYL CITRATE 50 UG/ML
INJECTION, SOLUTION INTRAMUSCULAR; INTRAVENOUS AS NEEDED
Status: DISCONTINUED | OUTPATIENT
Start: 2025-04-22 | End: 2025-04-22

## 2025-04-22 RX ORDER — MEPERIDINE HYDROCHLORIDE 25 MG/ML
12.5 INJECTION INTRAMUSCULAR; INTRAVENOUS; SUBCUTANEOUS EVERY 10 MIN PRN
Status: DISCONTINUED | OUTPATIENT
Start: 2025-04-22 | End: 2025-04-23 | Stop reason: HOSPADM

## 2025-04-22 RX ORDER — DROPERIDOL 2.5 MG/ML
0.62 INJECTION, SOLUTION INTRAMUSCULAR; INTRAVENOUS ONCE AS NEEDED
Status: DISCONTINUED | OUTPATIENT
Start: 2025-04-22 | End: 2025-04-23 | Stop reason: HOSPADM

## 2025-04-22 RX ORDER — MIDAZOLAM HYDROCHLORIDE 1 MG/ML
INJECTION, SOLUTION INTRAMUSCULAR; INTRAVENOUS AS NEEDED
Status: DISCONTINUED | OUTPATIENT
Start: 2025-04-22 | End: 2025-04-22

## 2025-04-22 RX ORDER — LIDOCAINE HCL/PF 100 MG/5ML
SYRINGE (ML) INTRAVENOUS AS NEEDED
Status: DISCONTINUED | OUTPATIENT
Start: 2025-04-22 | End: 2025-04-22

## 2025-04-22 RX ORDER — SODIUM CHLORIDE, SODIUM LACTATE, POTASSIUM CHLORIDE, CALCIUM CHLORIDE 600; 310; 30; 20 MG/100ML; MG/100ML; MG/100ML; MG/100ML
20 INJECTION, SOLUTION INTRAVENOUS CONTINUOUS
Status: DISCONTINUED | OUTPATIENT
Start: 2025-04-22 | End: 2025-04-23 | Stop reason: HOSPADM

## 2025-04-22 RX ORDER — OXYCODONE HYDROCHLORIDE 5 MG/1
5 TABLET ORAL EVERY 4 HOURS PRN
Status: DISCONTINUED | OUTPATIENT
Start: 2025-04-22 | End: 2025-04-23 | Stop reason: HOSPADM

## 2025-04-22 RX ORDER — DIPHENHYDRAMINE HYDROCHLORIDE 50 MG/ML
12.5 INJECTION, SOLUTION INTRAMUSCULAR; INTRAVENOUS ONCE AS NEEDED
Status: DISCONTINUED | OUTPATIENT
Start: 2025-04-22 | End: 2025-04-23 | Stop reason: HOSPADM

## 2025-04-22 RX ORDER — PROPOFOL 10 MG/ML
INJECTION, EMULSION INTRAVENOUS AS NEEDED
Status: DISCONTINUED | OUTPATIENT
Start: 2025-04-22 | End: 2025-04-22

## 2025-04-22 RX ORDER — ALBUTEROL SULFATE 0.83 MG/ML
2.5 SOLUTION RESPIRATORY (INHALATION) ONCE AS NEEDED
Status: DISCONTINUED | OUTPATIENT
Start: 2025-04-22 | End: 2025-04-23 | Stop reason: HOSPADM

## 2025-04-22 RX ORDER — SODIUM CHLORIDE, SODIUM LACTATE, POTASSIUM CHLORIDE, CALCIUM CHLORIDE 600; 310; 30; 20 MG/100ML; MG/100ML; MG/100ML; MG/100ML
100 INJECTION, SOLUTION INTRAVENOUS CONTINUOUS
Status: DISCONTINUED | OUTPATIENT
Start: 2025-04-22 | End: 2025-04-23 | Stop reason: HOSPADM

## 2025-04-22 RX ADMIN — SODIUM CHLORIDE, SODIUM LACTATE, POTASSIUM CHLORIDE, AND CALCIUM CHLORIDE 20 ML/HR: .6; .31; .03; .02 INJECTION, SOLUTION INTRAVENOUS at 07:38

## 2025-04-22 RX ADMIN — PROPOFOL 3 MG: 10 INJECTION, EMULSION INTRAVENOUS at 08:22

## 2025-04-22 RX ADMIN — FENTANYL CITRATE 50 MCG: 50 INJECTION, SOLUTION INTRAMUSCULAR; INTRAVENOUS at 08:13

## 2025-04-22 RX ADMIN — FENTANYL CITRATE 50 MCG: 50 INJECTION, SOLUTION INTRAMUSCULAR; INTRAVENOUS at 08:15

## 2025-04-22 RX ADMIN — PROPOFOL 30 MG: 10 INJECTION, EMULSION INTRAVENOUS at 08:18

## 2025-04-22 RX ADMIN — LIDOCAINE HYDROCHLORIDE 60 MG: 20 INJECTION INTRAVENOUS at 08:15

## 2025-04-22 RX ADMIN — PROPOFOL 50 MG: 10 INJECTION, EMULSION INTRAVENOUS at 08:15

## 2025-04-22 RX ADMIN — MIDAZOLAM 2 MG: 1 INJECTION INTRAMUSCULAR; INTRAVENOUS at 08:13

## 2025-04-22 SDOH — HEALTH STABILITY: MENTAL HEALTH: CURRENT SMOKER: 0

## 2025-04-22 ASSESSMENT — COLUMBIA-SUICIDE SEVERITY RATING SCALE - C-SSRS
2. HAVE YOU ACTUALLY HAD ANY THOUGHTS OF KILLING YOURSELF?: NO
6. HAVE YOU EVER DONE ANYTHING, STARTED TO DO ANYTHING, OR PREPARED TO DO ANYTHING TO END YOUR LIFE?: NO
1. IN THE PAST MONTH, HAVE YOU WISHED YOU WERE DEAD OR WISHED YOU COULD GO TO SLEEP AND NOT WAKE UP?: NO

## 2025-04-22 ASSESSMENT — PAIN SCALES - GENERAL
PAINLEVEL_OUTOF10: 0 - NO PAIN

## 2025-04-22 ASSESSMENT — PAIN - FUNCTIONAL ASSESSMENT: PAIN_FUNCTIONAL_ASSESSMENT: 0-10

## 2025-04-22 NOTE — ANESTHESIA PREPROCEDURE EVALUATION
"Patient: Lupe Schmidt \"Calin\"    Procedure Information       Date/Time: 04/22/25 0830    Scheduled providers: José Braden MD; Gopi Reyes MD    Procedure: EGD    Location: [facility] OR          Vitals:    04/22/25 0715   BP: 122/81   Pulse: 79   Resp: 16   Temp: 36.4 °C (97.5 °F)   SpO2: 99%       Surgical History[1]  Medical History[2]  Current Medications[3]  Prior to Admission medications    Medication Sig Start Date End Date Taking? Authorizing Provider   NON FORMULARY Insert 1 each into the vagina once daily. DHEA, ESTRADIOL, PROGESTERONE CREAM 12/18/24  Yes Historical Provider, MD   NON FORMULARY Inject 1 each under the skin 1 (one) time per week. Pt states she takes a GLP-1 compound from [facility] once weekly on Saturdays. Current dose is 18 units   Yes Historical Provider, MD   progesterone (Prometrium) 200 mg capsule Take 1 capsule (200 mg) by mouth once daily. 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    Substance Use Topics    Alcohol use: Yes     Alcohol/week: 1.0 standard drink of alcohol     Types: 1 Standard drinks or equivalent per week     Comment: 3/7/25: Patient states -MAYBE ONCE A MONTH, IF THAT MC RN         Chemistry    Lab Results   Component Value Date/Time     03/29/2025 1156    K 4.5 03/29/2025 1156     03/29/2025 1156    CO2 22 03/29/2025 1156    BUN 22 03/29/2025 1156    CREATININE 0.87 03/29/2025 1156    Lab Results   Component Value Date/Time    CALCIUM 8.9 03/29/2025 1156    ALKPHOS 52 03/29/2025 1156    AST 19 03/29/2025 1156    ALT 12 03/29/2025 1156    BILITOT 0.6 03/29/2025 1156          Lab Results   Component Value Date/Time    WBC 9.5 03/29/2025 1156    HGB 13.9 03/29/2025 1156    HCT 42.7 03/29/2025 1156     03/29/2025 1156     Lab Results   Component Value Date/Time "    PROTIME 10.9 2025 1156    INR 1.0 2025 1156     Encounter Date: 25   ECG 12 lead (Clinic Performed)   Result Value    Ventricular Rate 86    Atrial Rate 86    OK Interval 138    QRS Duration 76    QT Interval 352    QTC Calculation(Bazett) 421    P Axis 13    R Axis 78    T Axis 57    QRS Count 14    Q Onset 222    P Onset 153    P Offset 208    T Offset 398    QTC Fredericia 397    Narrative    Normal sinus rhythm  Normal ECG  No previous ECGs available  Confirmed by Daniel Noel ([id number]) on 2025 1:01:34 AM     No results found for this or any previous visit from the past 1095 days.    Relevant Problems   Cardiac   (+) Other hyperlipidemia      Endocrine   (+) Morbid obesity (Multi)   (+) Subclinical hypothyroidism       Clinical information reviewed:   Tobacco  Allergies  Meds   Med Hx  Surg Hx  OB Status  Fam Hx  Soc   Hx        NPO Detail:  NPO/Void Status  Carbohydrate Drink Given Prior to Surgery? : N  Date of Last Liquid: 25  Time of Last Liquid:   Date of Last Solid: 25  Time of Last Solid:   Last Intake Type: Clear fluids  Time of Last Void: 0700         Physical Exam    Airway  Mallampati: II     Cardiovascular - normal exam   Dental    Pulmonary - normal exam   Abdominal            Anesthesia Plan    History of general anesthesia?: yes  History of complications of general anesthesia?: no    ASA 3     MAC     The patient is not a current smoker.  Patient was not previously instructed to abstain from smoking on day of procedure.  Patient did not smoke on day of procedure.  Education provided regarding risk of obstructive sleep apnea.  intravenous induction   Anesthetic plan and risks discussed with patient.    Plan discussed with CRNA.           [1]   Past Surgical History:  Procedure Laterality Date     SECTION, LOW TRANSVERSE  , ,     COLONOSCOPY      EYE SURGERY  LASIK     WISDOM TOOTH EXTRACTION  1985   [2]   Past Medical  History:  Diagnosis Date    Arthritis     Bariatric surgery status     Chronic joint pain     Class 3 severe obesity with body mass index (BMI) of 40.0 to 44.9 in adult 04/01/2025    41.68 kg/m²    Encounter for pre-operative cardiovascular clearance 04/01/2025    Gualberto Mackenzie MD  Physician Cardiology    Encounter for vitamin deficiency screening     Hyperlipidemia     Hypothyroidism     Subclinical hypothyroidism    Mild sleep apnea 04/01/2025    Philip Moise MD Sleep Medicine, CPAP not required    Varicella 1976    Wears glasses    [3]   Current Outpatient Medications:     NON FORMULARY, Insert 1 each into the vagina once daily. DHEA, ESTRADIOL, PROGESTERONE CREAM, Disp: , Rfl:     NON FORMULARY, Inject 1 each under the skin 1 (one) time per week. Pt states she takes a GLP-1 compound from [facility] once weekly on Saturdays. Current dose is 18 units, Disp: , Rfl:     progesterone (Prometrium) 200 mg capsule, Take 1 capsule (200 mg) by mouth once daily., Disp: , Rfl:     Current Facility-Administered Medications:     lactated Ringer's infusion, 20 mL/hr, intravenous, Continuous, Gopi Reyes MD, Last Rate: 20 mL/hr at 04/22/25 0738, 20 mL/hr at 04/22/25 0738  [4]   Allergies  Allergen Reactions    Sulfa (Sulfonamide Antibiotics) Shortness of breath

## 2025-04-22 NOTE — ANESTHESIA POSTPROCEDURE EVALUATION
"Patient: Lupe Schmidt \"Calin\"    Procedure Summary       Date: 04/22/25 Room / Location: [facility] OR    Anesthesia Start: 0812 Anesthesia Stop: 0837    Procedure: EGD Diagnosis:       BMI 40.0-44.9, adult (Multi)      Other hyperlipidemia      Subclinical hypothyroidism      Chronic joint pain      Encounter for vitamin deficiency screening    Scheduled Providers: José Braden MD; Gopi Reyes MD Responsible Provider: Gopi Reyes MD    Anesthesia Type: MAC ASA Status: 3            Anesthesia Type: MAC    Vitals Value Taken Time   /78 04/22/25 08:45   Temp 36.8 °C (98.2 °F) 04/22/25 08:30   Pulse 74 04/22/25 08:45   Resp 16 04/22/25 08:45   SpO2 97 % 04/22/25 08:45       Anesthesia Post Evaluation    Patient location during evaluation: PACU  Patient participation: complete - patient participated  Level of consciousness: awake  Pain management: adequate  Multimodal analgesia pain management approach  Airway patency: patent  Two or more strategies used to mitigate risk of obstructive sleep apnea  Cardiovascular status: acceptable  Respiratory status: acceptable  Hydration status: acceptable  Postoperative Nausea and Vomiting: none        No notable events documented.    "

## 2025-04-22 NOTE — H&P
Bariatric/General Surgery H&P    4/22/2025 8:07 AM  Lupe Schmidt  [id number]    Lupe Schmidt is a 59 y.o. year old female with morbid obesity and GERD. she presents today for endoscopic evaluation of GERD in preparation for bariatric surgery.    PAST MEDICAL HISTORY:  Medical History[1]     PAST SURGICAL HISTORY:  Surgical History[2]    FAMILY HISTORY:  Family History[3]     SOCIAL HISTORY:  Social History[4]    MEDICATIONS:  Prior to Admission Medications:  [facility]    ALLERGIES:  Allergies[5]    REVIEW OF SYSTEMS:  GENERAL: Negative for malaise, significant weight loss and fever  NECK: Negative for lumps, goiter, pain and significant neck swelling  RESPIRATORY: Negative for cough, wheezing or shortness of breath.  CARDIOVASCULAR: Negative for chest pain, leg swelling or palpitations.  GI: Negative for abdominal discomfort, blood in stools or black stools or change in bowel habits  : No history of dysuria, frequency or incontinence  MUSCULOSKELETAL: Negative for joint pain or swelling, back pain or muscle pain.  SKIN: Negative for lesions, rash, and itching.  PSYCH: Negative for sleep disturbance, mood disorder and recent psychosocial stressors.  ENDOCRINE: Negative for cold or heat intolerance, polyuria, polydipsia and goiter.    PHYSICAL EXAM:  Vitals:    04/22/25 0715   BP: 122/81   Pulse: 79   Resp: 16   Temp: 36.4 °C (97.5 °F)   SpO2: 99%     General appearance: obese  Skin: warm, no erythema or rashes  Lungs: clear to percussion and auscultation  Heart: regular rhythm and S1, S2 normal  Abdomen: soft, non-tender, no masses, no organomegaly  Extremities: Normal exam of the extremities. No swelling or pain.    IMPRESSION:  Lupe Schmidt is a 59 y.o. female with morbid obesity and GERD.    PLAN:  Endoscopy, possible biopsy today.    The risks of the procedure including bleeding, perforation, need for further procedure and death have been explained to the patient and Lupe Schmidt has expressed  understanding and acceptance of them. Consent has been signed.    José Braden MD  Bariatric and Minimally Invasive General Surgery         [1]   Past Medical History:  Diagnosis Date    Arthritis     Bariatric surgery status     Chronic joint pain     Class 3 severe obesity with body mass index (BMI) of 40.0 to 44.9 in adult 2025    41.68 kg/m²    Encounter for pre-operative cardiovascular clearance 2025    Gualberto Mackenzie MD  Physician Cardiology    Encounter for vitamin deficiency screening     Hyperlipidemia     Hypothyroidism     Subclinical hypothyroidism    Mild sleep apnea 2025    Philip Moise MD Sleep Medicine, CPAP not required    Varicella     Wears glasses    [2]   Past Surgical History:  Procedure Laterality Date     SECTION, LOW TRANSVERSE  , ,     COLONOSCOPY      EYE SURGERY  LASIK     WISDOM TOOTH EXTRACTION  1985   [3]   Family History  Problem Relation Name Age of Onset    Arthritis Mother DEMETRIA GUARDADO     Alcohol abuse Father AASHISH SR ULISES    [4]   Social History  Tobacco Use    Smoking status: Never    Smokeless tobacco: Never    Tobacco comments:     3/7/25: Patient stated never smoked. [facility] RN    Substance Use Topics    Alcohol use: Yes     Alcohol/week: 1.0 standard drink of alcohol     Types: 1 Standard drinks or equivalent per week     Comment: 3/7/25: Patient states -MAYBE ONCE A MONTH, IF THAT [facility] RN    Drug use: Never     Comment: 3/7/25: patient verbalized [facility] RN   [5]   Allergies  Allergen Reactions    Sulfa (Sulfonamide Antibiotics) Shortness of breath

## 2025-04-24 ENCOUNTER — TELEPHONE (OUTPATIENT)
Dept: SURGERY | Facility: CLINIC | Age: 60
End: 2025-04-24
Payer: COMMERCIAL

## 2025-04-24 NOTE — TELEPHONE ENCOUNTER
Patient states she was made aware of her $10,000 bariatric lifetime max back in November 2024 when she reached out to her insurance company to get more information on the Bariatric process. Per her request, I will work on getting her in touch with someone that can help get her information on a self-pay option.

## 2025-04-29 ENCOUNTER — APPOINTMENT (OUTPATIENT)
Dept: SURGERY | Facility: CLINIC | Age: 60
End: 2025-04-29
Payer: COMMERCIAL

## 2025-04-29 VITALS — HEIGHT: 63 IN | BODY MASS INDEX: 40.93 KG/M2 | WEIGHT: 231 LBS

## 2025-04-29 NOTE — PROGRESS NOTES
"PREOPERATIVE, MULTIDISCIPLINARY, MEDICALLY SUPERVISED, REDUCED CALORIE DIET, BEHAVIOR MODIFICATION AND EXERCISE PROGRAM    S:  The patient has been working on practicing pre-op behaviors.  24 hour food recall: B: Protein shake made with Whey unsweetened Kefir and unsweetened cashew milk; s: KIND bar 150 calories; L: grilled chicken salad with light dressing; s: banana; D: large Nai's chili    O:    Vitals:    04/29/25 1141   Weight: 105 kg (231 lb)    Ht:   1.6 m (5' 3\")  BMI: Body mass index is 40.92 kg/m².    Goal: 5% body weight loss over the course of program    Dietary recommendation:   1. Continue to drink at least 64 ounces of water and calorie free, non-carbonated, and decaffeinated beverages  2. Practice the 30-30-30 rule by drinking between meals.  3. Structure your meal plan - have 3 meals and 1 snack daily. Snack on fresh fruit, string cheese or vegetables with 2 TBS. Hummus in place of KIND bar.  4. Have balanced meals that always contain a good source of protein.   5. Increase intake of non-starchy vegetables.  Have 5 servings fruits and vegetables daily.   6. Continue to take a multivitamin daily.  7. Increase physical activity by 10-15 minutes to an end goal of 60 minutes 5 x per week.    Group Topic: Get Movin'    Behavioral recommendation: Pt is encouraged to identify and try different types of physical activity.    A/P: Pt appears to have a good understanding of how to incorporate physical activity into their daily schedule.  Pt has a goal to begin making small time commitments each week to fit in 30 minutes of exercise on as many days as possible.    Exercise: aerobic exercise 1x/wk for 60 min. and resistance exercise 3x/wk for 60 min.    The patient has nutritional clearance for surgery.  She will continue to work on furthering her weight loss and will follow-up in a month.    Sarah Gonzalez, MAICO, LD  "

## 2025-05-01 LAB
LABORATORY COMMENT REPORT: NORMAL
PATH REPORT.FINAL DX SPEC: NORMAL
PATH REPORT.GROSS SPEC: NORMAL
PATH REPORT.RELEVANT HX SPEC: NORMAL
PATH REPORT.TOTAL CANCER: NORMAL

## 2025-05-07 ENCOUNTER — APPOINTMENT (OUTPATIENT)
Dept: RADIOLOGY | Facility: HOSPITAL | Age: 60
End: 2025-05-07
Payer: COMMERCIAL

## 2025-05-09 ENCOUNTER — APPOINTMENT (OUTPATIENT)
Dept: RADIOLOGY | Facility: HOSPITAL | Age: 60
End: 2025-05-09
Payer: COMMERCIAL

## 2025-05-09 DIAGNOSIS — E66.01 MORBID OBESITY (MULTI): ICD-10-CM

## 2025-05-09 DIAGNOSIS — K44.9 HIATAL HERNIA: ICD-10-CM

## 2025-05-09 PROCEDURE — 2500000001 HC RX 250 WO HCPCS SELF ADMINISTERED DRUGS (ALT 637 FOR MEDICARE OP): Performed by: SURGERY

## 2025-05-09 PROCEDURE — 2500000005 HC RX 250 GENERAL PHARMACY W/O HCPCS: Performed by: SURGERY

## 2025-05-09 PROCEDURE — A9698 NON-RAD CONTRAST MATERIALNOC: HCPCS | Performed by: SURGERY

## 2025-05-09 PROCEDURE — 74220 X-RAY XM ESOPHAGUS 1CNTRST: CPT

## 2025-05-09 RX ADMIN — BARIUM SULFATE 355 ML: 0.6 SUSPENSION ORAL at 09:10

## 2025-05-09 RX ADMIN — ANTACID/ANTIFLATULENT 1 PACKET: 380; 550; 10; 10 GRANULE, EFFERVESCENT ORAL at 09:00

## 2025-05-09 RX ADMIN — BARIUM SULFATE 340 ML: 980 POWDER, FOR SUSPENSION ORAL at 09:05

## 2025-05-19 ENCOUNTER — TELEPHONE (OUTPATIENT)
Dept: SURGERY | Facility: CLINIC | Age: 60
End: 2025-05-19
Payer: COMMERCIAL

## 2025-05-19 DIAGNOSIS — Z98.84 BARIATRIC SURGERY STATUS: ICD-10-CM

## 2025-05-31 LAB
AMPHETAMINES UR QL: NEGATIVE NG/ML
BARBITURATES UR QL: NEGATIVE NG/ML
BENZODIAZ UR QL: NEGATIVE NG/ML
BZE UR QL: NEGATIVE NG/ML
CREAT UR-MCNC: 153.4 MG/DL
FENTANYL UR QL SCN: NEGATIVE NG/ML
METHADONE UR QL: NEGATIVE NG/ML
OPIATES UR QL: NEGATIVE NG/ML
OXIDANTS UR QL: NEGATIVE MCG/ML
OXYCODONE UR QL: NEGATIVE NG/ML
PCP UR QL: NEGATIVE NG/ML
PH UR: 7.1 [PH] (ref 4.5–9)
QUEST NOTES AND COMMENTS: NORMAL
THC UR QL: NEGATIVE NG/ML

## 2025-06-02 ENCOUNTER — PREP FOR PROCEDURE (OUTPATIENT)
Dept: SURGERY | Facility: CLINIC | Age: 60
End: 2025-06-02
Payer: COMMERCIAL

## 2025-06-02 DIAGNOSIS — E66.01 MORBID OBESITY DUE TO EXCESS CALORIES (MULTI): Primary | ICD-10-CM

## 2025-06-02 DIAGNOSIS — E66.01 MORBID OBESITY (MULTI): ICD-10-CM

## 2025-06-02 RX ORDER — CELECOXIB 400 MG/1
400 CAPSULE ORAL ONCE
OUTPATIENT
Start: 2025-06-02 | End: 2025-06-02

## 2025-06-02 RX ORDER — GABAPENTIN 300 MG/1
600 CAPSULE ORAL ONCE
OUTPATIENT
Start: 2025-06-02 | End: 2025-06-02

## 2025-06-02 RX ORDER — SCOPOLAMINE 1 MG/3D
1 PATCH, EXTENDED RELEASE TRANSDERMAL ONCE
OUTPATIENT
Start: 2025-06-02 | End: 2025-06-02

## 2025-06-02 RX ORDER — CEFAZOLIN SODIUM 2 G/100ML
2 INJECTION, SOLUTION INTRAVENOUS ONCE
OUTPATIENT
Start: 2025-06-02 | End: 2025-06-02

## 2025-06-02 RX ORDER — APREPITANT 40 MG/1
40 CAPSULE ORAL ONCE
OUTPATIENT
Start: 2025-06-02 | End: 2025-06-02

## 2025-06-02 RX ORDER — HEPARIN SODIUM 5000 [USP'U]/ML
5000 INJECTION, SOLUTION INTRAVENOUS; SUBCUTANEOUS ONCE
OUTPATIENT
Start: 2025-06-02 | End: 2025-06-02

## 2025-06-02 RX ORDER — ACETAMINOPHEN 325 MG/1
650 TABLET ORAL ONCE
OUTPATIENT
Start: 2025-06-02 | End: 2025-06-02

## 2025-06-03 ENCOUNTER — APPOINTMENT (OUTPATIENT)
Dept: BEHAVIORAL HEALTH | Facility: CLINIC | Age: 60
End: 2025-06-03
Payer: COMMERCIAL

## 2025-06-04 ENCOUNTER — DOCUMENTATION (OUTPATIENT)
Dept: SURGERY | Facility: CLINIC | Age: 60
End: 2025-06-04

## 2025-06-04 ENCOUNTER — APPOINTMENT (OUTPATIENT)
Dept: SURGERY | Facility: CLINIC | Age: 60
End: 2025-06-04
Payer: COMMERCIAL

## 2025-06-04 VITALS — BODY MASS INDEX: 40.04 KG/M2 | WEIGHT: 226 LBS | HEIGHT: 63 IN

## 2025-06-04 NOTE — PROGRESS NOTES
"PREOPERATIVE, MULTIDISCIPLINARY, MEDICALLY SUPERVISED, REDUCED CALORIE DIET, BEHAVIOR MODIFICATION AND EXERCISE PROGRAM    S:  The patient is scheduled for gastric sleeve surgery on 7/01/25.    Planning on starting the the 2 week pre-op diet on 6/17/24.   Diet will consist of a protein shake for breakfast and lunch with option of snacking on cooked/raw vegetables without added fats.  Patient will have a frozen light meal with 15 grams protein and 300 calories for dinner and considering making prepared meals, for an alternative, with 4 oz. lean protein, 1/2c. cooked starch and vegetables.  Drinking 64 ounces of water and calorie free, non-carbonated and decaffeinated fluids daily and walking regularly.    O:      Vitals:    06/04/25 1135   Weight: 103 kg (226 lb)      Ht:  1.6 m (5' 3\")   BMI:  Body mass index is 40.03 kg/m².     Dietary recommendation:   1. Start the 2 week pre-op diet on 6/17/25.  2. Continue to practice the 30-30-30 rule by drinking between meals.  3. Discontinue taking your MVI 1 week before surgery.  4. The day before surgery, drink clear fluids only.  No shakes and no red dyes.  5. The night before surgery and 2 hours prior to surgery, drink 12 oz. Gatorade or G2 if watching blood sugar, without red dye. (Unless specified otherwise at your pre-admission visit)    A/P: Pt appears to understand the 2 week pre-op diet.   Reviewed post-op vit/mineral supplements and post-op full liquid diet.    Follow-up 1-2 week post-op.  Patient will email me with any questions.     Sarah Gonzalez, MAICO, LD  "

## 2025-06-09 ENCOUNTER — TELEPHONE (OUTPATIENT)
Dept: SURGERY | Facility: CLINIC | Age: 60
End: 2025-06-09
Payer: COMMERCIAL

## 2025-06-09 NOTE — TELEPHONE ENCOUNTER
This RN attempted to call patient to go over general information prior to your scheduled visit with Dr. Braden on 6/13/25 at 1:30 pm at [facility] (inside [facility], main floor in the [facility]).   Parking is available at a cost of $5.00 at the Main Entrance.  We look forward to seeing you!  Polina Geronimo RN, Clinic Nurse

## 2025-06-11 ENCOUNTER — DOCUMENTATION (OUTPATIENT)
Dept: SURGERY | Facility: CLINIC | Age: 60
End: 2025-06-11
Payer: COMMERCIAL

## 2025-06-11 NOTE — PROGRESS NOTES
Lupe Schmidt attended final pre op class. Patient asked appropriate questions during class.     Index Surgery  Date of Surgery: 7/1/25   Surgeon: Dr José Braden    Surgical Procedure: Laparoscopic sleeve gastrectomy  [id number]    Polina Geronimo, [place]

## 2025-06-13 ENCOUNTER — APPOINTMENT (OUTPATIENT)
Dept: SURGERY | Facility: CLINIC | Age: 60
End: 2025-06-13
Payer: COMMERCIAL

## 2025-06-13 VITALS
DIASTOLIC BLOOD PRESSURE: 91 MMHG | RESPIRATION RATE: 18 BRPM | BODY MASS INDEX: 40.64 KG/M2 | WEIGHT: 229.4 LBS | HEART RATE: 78 BPM | HEIGHT: 63 IN | OXYGEN SATURATION: 99 % | SYSTOLIC BLOOD PRESSURE: 150 MMHG

## 2025-06-13 DIAGNOSIS — Z98.84 S/P LAPAROSCOPIC SLEEVE GASTRECTOMY: ICD-10-CM

## 2025-06-13 DIAGNOSIS — E03.8 SUBCLINICAL HYPOTHYROIDISM: ICD-10-CM

## 2025-06-13 DIAGNOSIS — G89.29 CHRONIC JOINT PAIN: ICD-10-CM

## 2025-06-13 DIAGNOSIS — M25.50 CHRONIC JOINT PAIN: ICD-10-CM

## 2025-06-13 DIAGNOSIS — E78.49 OTHER HYPERLIPIDEMIA: ICD-10-CM

## 2025-06-13 PROCEDURE — RXMED WILLOW AMBULATORY MEDICATION CHARGE

## 2025-06-13 PROCEDURE — 1036F TOBACCO NON-USER: CPT | Performed by: SURGERY

## 2025-06-13 PROCEDURE — 99215 OFFICE O/P EST HI 40 MIN: CPT | Performed by: SURGERY

## 2025-06-13 PROCEDURE — 3008F BODY MASS INDEX DOCD: CPT | Performed by: SURGERY

## 2025-06-13 RX ORDER — BISMUTH SUBSALICYLATE 262 MG
1 TABLET,CHEWABLE ORAL DAILY
COMMUNITY

## 2025-06-13 RX ORDER — OXYCODONE HYDROCHLORIDE 5 MG/1
5 TABLET ORAL EVERY 6 HOURS PRN
Qty: 24 TABLET | Refills: 0 | Status: SHIPPED | OUTPATIENT
Start: 2025-06-13 | End: 2025-06-19

## 2025-06-13 RX ORDER — ONDANSETRON 4 MG/1
4 TABLET, ORALLY DISINTEGRATING ORAL EVERY 6 HOURS PRN
Qty: 15 TABLET | Refills: 1 | Status: SHIPPED | OUTPATIENT
Start: 2025-06-13

## 2025-06-13 RX ORDER — OMEPRAZOLE 40 MG/1
40 CAPSULE, DELAYED RELEASE ORAL
Qty: 30 CAPSULE | Refills: 5 | Status: SHIPPED | OUTPATIENT
Start: 2025-06-13

## 2025-06-13 ASSESSMENT — PAIN SCALES - GENERAL: PAINLEVEL_OUTOF10: 0-NO PAIN

## 2025-06-13 NOTE — PROGRESS NOTES
BARIATRIC SURGERY PREOPERATIVE VISIT    Date: 06/13/25  Time: 2:13 PM    Name: Lupe Schmidt    MRN: [id number]    This is a 59 y.o. y.o. female with morbid obesity (Body mass index is 40.64 kg/m².) who plans to undergo Laparoscopic sleeve gastrectomy  20652 surgery. They have completed a rigorous preoperative medical work-up and bariatric surgery educational program.     PMH: Problem List[1]    PSH: Surgical History[2]    Social hx:   Social History     Socioeconomic History    Marital status:      Spouse name: Not on file    Number of children: Not on file    Years of education: Not on file    Highest education level: Not on file   Occupational History    Not on file   Tobacco Use    Smoking status: Never     Passive exposure: Past (6/13/25  Growing Up with smokers in home [facility] LPN)    Smokeless tobacco: Never    Tobacco comments:     6/13/25 Calin verbalizes never smoking [facility]   Vaping Use    Vaping status: Never Used   Substance and Sexual Activity    Alcohol use: Not Currently     Comment: 6/13/25 Calin verbalizes couple times a years 1 beer or mixed drink per time [facility]    Drug use: Never     Comment: 6/13/25  Calin verbally denies now / hx of [facility]    Sexual activity: Yes     Partners: Male     Birth control/protection: Post-menopausal, Female Sterilization   Other Topics Concern    Not on file   Social History Narrative    Not on file     Social Drivers of Health     Financial Resource Strain: Not on file   Food Insecurity: Not on file   Transportation Needs: Not on file   Physical Activity: Not on file   Stress: Not on file   Social Connections: Not on file   Intimate Partner Violence: Not on file   Housing Stability: Not on file       Initial weight: 235 lbs  Current weight:   Vitals:    06/13/25 1259   Weight: 104 kg (229 lb 6.4 oz)       Preop Clearances:  Cardiac: Cleared  Pulmonary: Cleared  Psych: Cleared    History of Clotting Disorder: none  Anticoagulation plan: n/a    Other:  n/a    Sleep Study: No apnea    EGD: Findings: normal anatomy    Preadmission testing date: 6/18/25    MEDICATIONS:  Prior to Admission Medications:  Current Medications[3]    ALLERGIES:  RX Allergies[4]    REVIEW OF SYSTEMS:  GENERAL: Obese. Negative for malaise, significant weight loss and fever  NECK: Negative for lumps, goiter, pain and significant neck swelling  RESPIRATORY: Negative for cough, wheezing or shortness of breath.  CARDIOVASCULAR: Negative for chest pain, leg swelling or palpitations.  GI: Negative for abdominal discomfort, blood in stools or black stools or change in bowel habits  : No history of dysuria, frequency or incontinence  MUSCULOSKELETAL: Negative for joint pain or swelling, back pain or muscle pain.  SKIN: Negative for lesions, rash, and itching.  PSYCH: Negative for sleep disturbance, mood disorder and recent psychosocial stressors.  ENDOCRINE: Negative for cold or heat intolerance, polyuria, polydipsia and goiter.    PHYSICAL EXAM:  Visit Vitals  BP (!) 150/91 Comment: Calin verbally denies SOB, Chest Pain, HA, Blurred Vision, Radiating arm/jaw pain. [facility] LPN   Pulse 78   Resp 18       General appearance: obese  Skin: warm, no erythema or rashes  Lungs: clear to percussion and auscultation  Heart: regular rhythm and S1, S2 normal  Abdomen: soft, non-tender, no masses, no organomegaly  Extremities: Normal exam of the extremities. No swelling or pain.    No results found for this or any previous visit (from the past 24 hours).    IMPRESSION:  Lupe Schmidt is a 59 y.o. y.o. female with a BMI of Body mass index is 40.64 kg/m²..    They have been preoperatively evaluated and deemed to be an appropriate candidate for bariatric surgery.  Surgery Type: Laparoscopic sleeve gastrectomy  48657    All testing reviewed.  All clearances contained.    PLAN:    VTE risk calculator score of 0.16%, so the patient will require none weeks of extended VTE prophylaxis with Lovenox subcutaneous.    The  risks of Laparoscopic sleeve gastrectomy  38314 surgery including bleeding, leak, wound infection, dehydration, ulcers, internal hernia, DVT/PE, prolonged nausea/vomiting, incomplete resolution of associated medical conditions, reflux, weight regain, vitamin/mineral deficiencies, and death have been explained to the patient and Lupe Schmidt has expressed understanding and acceptance of them.    The benefits of the above surgery including weight loss, improvement/resolution of associated medical and mental health conditions, improved mobility, and decreased mortality have been explained the the patient and Lupeantonio Schmidt has expressed understanding and acceptance of them.    Operative and blood transfusion consent forms were signed by the patient and witnessed today.    Prescriptions for all required post-operative home medications were sent to the [facility] pharmacy today and will be delivered to the patient's bedside on the day of discharge.    Further education was provided on day of surgery instructions and what to expect from the inpatient admission after surgery.    José Braden MD  Bariatric and Minimally Invasive General Surgery         [1]   Patient Active Problem List  Diagnosis    Subclinical hypothyroidism    Other hyperlipidemia    BMI 40.0-44.9, adult (Multi)    Bariatric surgery status    Encounter for vitamin deficiency screening    Pre-operative clearance    Morbid obesity (Multi)    Chronic joint pain   [2]   Past Surgical History:  Procedure Laterality Date     SECTION, LOW TRANSVERSE  , ,     COLONOSCOPY      EYE SURGERY  LASIK     WISDOM TOOTH EXTRACTION     [3]   Current Outpatient Medications:     multivitamin tablet, Take 1 tablet by mouth once daily., Disp: , Rfl:     NON FORMULARY, Insert 1 each into the vagina once daily. DHEA, ESTRADIOL, PROGESTERONE CREAM, Disp: , Rfl:     NON FORMULARY, Inject 1 each under the skin 1 (one) time per week.  Pt states she takes a GLP-1 compound from [facility] once weekly on Saturdays. Current dose is 18 units, Disp: , Rfl:     progesterone (Prometrium) 200 mg capsule, Take 1 capsule (200 mg) by mouth once daily., Disp: , Rfl:   [4]   Allergies  Allergen Reactions    Sulfa (Sulfonamide Antibiotics) Shortness of breath

## 2025-06-18 ENCOUNTER — HOSPITAL ENCOUNTER (OUTPATIENT)
Dept: RADIOLOGY | Facility: HOSPITAL | Age: 60
Discharge: HOME | End: 2025-06-18
Payer: COMMERCIAL

## 2025-06-18 ENCOUNTER — PRE-ADMISSION TESTING (OUTPATIENT)
Dept: PREADMISSION TESTING | Facility: HOSPITAL | Age: 60
End: 2025-06-18
Payer: COMMERCIAL

## 2025-06-18 VITALS
SYSTOLIC BLOOD PRESSURE: 140 MMHG | BODY MASS INDEX: 40.2 KG/M2 | HEIGHT: 63 IN | OXYGEN SATURATION: 95 % | TEMPERATURE: 96.8 F | RESPIRATION RATE: 18 BRPM | HEART RATE: 80 BPM | DIASTOLIC BLOOD PRESSURE: 85 MMHG | WEIGHT: 226.85 LBS

## 2025-06-18 DIAGNOSIS — Z01.818 PRE-OPERATIVE CLEARANCE: Primary | ICD-10-CM

## 2025-06-18 DIAGNOSIS — E66.01 MORBID OBESITY (MULTI): ICD-10-CM

## 2025-06-18 DIAGNOSIS — Z01.818 PRE-OPERATIVE CLEARANCE: ICD-10-CM

## 2025-06-18 DIAGNOSIS — E78.2 MIXED HYPERLIPIDEMIA: ICD-10-CM

## 2025-06-18 PROBLEM — Z13.21 ENCOUNTER FOR VITAMIN DEFICIENCY SCREENING: Status: RESOLVED | Noted: 2025-03-19 | Resolved: 2025-06-18

## 2025-06-18 LAB
ABO GROUP (TYPE) IN BLOOD: NORMAL
ANION GAP SERPL CALC-SCNC: 11 MMOL/L (ref 10–20)
ANTIBODY SCREEN: NORMAL
APPEARANCE UR: ABNORMAL
APTT PPP: 32 SECONDS (ref 26–36)
BILIRUB UR STRIP.AUTO-MCNC: NEGATIVE MG/DL
BUN SERPL-MCNC: 27 MG/DL (ref 6–23)
CALCIUM SERPL-MCNC: 8.9 MG/DL (ref 8.6–10.3)
CHLORIDE SERPL-SCNC: 105 MMOL/L (ref 98–107)
CO2 SERPL-SCNC: 23 MMOL/L (ref 21–32)
COLOR UR: ABNORMAL
CREAT SERPL-MCNC: 0.78 MG/DL (ref 0.5–1.05)
EGFRCR SERPLBLD CKD-EPI 2021: 89 ML/MIN/1.73M*2
ERYTHROCYTE [DISTWIDTH] IN BLOOD BY AUTOMATED COUNT: 12.6 % (ref 11.5–14.5)
GLUCOSE SERPL-MCNC: 92 MG/DL (ref 74–99)
GLUCOSE UR STRIP.AUTO-MCNC: NORMAL MG/DL
HCT VFR BLD AUTO: 41 % (ref 36–46)
HGB BLD-MCNC: 13.6 G/DL (ref 12–16)
HOLD SPECIMEN: NORMAL
INR PPP: 1.1 (ref 0.9–1.1)
KETONES UR STRIP.AUTO-MCNC: ABNORMAL MG/DL
LEUKOCYTE ESTERASE UR QL STRIP.AUTO: ABNORMAL
MCH RBC QN AUTO: 31.3 PG (ref 26–34)
MCHC RBC AUTO-ENTMCNC: 33.2 G/DL (ref 32–36)
MCV RBC AUTO: 94 FL (ref 80–100)
NITRITE UR QL STRIP.AUTO: NEGATIVE
NRBC BLD-RTO: 0 /100 WBCS (ref 0–0)
PH UR STRIP.AUTO: 5.5 [PH]
PLATELET # BLD AUTO: 242 X10*3/UL (ref 150–450)
POTASSIUM SERPL-SCNC: 4.3 MMOL/L (ref 3.5–5.3)
PROT UR STRIP.AUTO-MCNC: ABNORMAL MG/DL
PROTHROMBIN TIME: 12.6 SECONDS (ref 9.8–12.4)
RBC # BLD AUTO: 4.35 X10*6/UL (ref 4–5.2)
RBC # UR STRIP.AUTO: ABNORMAL MG/DL
RBC #/AREA URNS AUTO: >20 /HPF
RH FACTOR (ANTIGEN D): NORMAL
SODIUM SERPL-SCNC: 135 MMOL/L (ref 136–145)
SP GR UR STRIP.AUTO: 1.03
SQUAMOUS #/AREA URNS AUTO: ABNORMAL /HPF
UROBILINOGEN UR STRIP.AUTO-MCNC: NORMAL MG/DL
WBC # BLD AUTO: 7 X10*3/UL (ref 4.4–11.3)
WBC #/AREA URNS AUTO: >50 /HPF

## 2025-06-18 PROCEDURE — 81001 URINALYSIS AUTO W/SCOPE: CPT

## 2025-06-18 PROCEDURE — 80048 BASIC METABOLIC PNL TOTAL CA: CPT | Performed by: SURGERY

## 2025-06-18 PROCEDURE — 86900 BLOOD TYPING SEROLOGIC ABO: CPT

## 2025-06-18 PROCEDURE — 80323 ALKALOIDS NOS: CPT

## 2025-06-18 PROCEDURE — 71046 X-RAY EXAM CHEST 2 VIEWS: CPT

## 2025-06-18 PROCEDURE — 99205 OFFICE O/P NEW HI 60 MIN: CPT | Performed by: NURSE PRACTITIONER

## 2025-06-18 PROCEDURE — 87086 URINE CULTURE/COLONY COUNT: CPT | Mod: GEALAB

## 2025-06-18 PROCEDURE — 85610 PROTHROMBIN TIME: CPT | Performed by: SURGERY

## 2025-06-18 PROCEDURE — 85027 COMPLETE CBC AUTOMATED: CPT | Performed by: SURGERY

## 2025-06-18 ASSESSMENT — ENCOUNTER SYMPTOMS
WEAKNESS: 0
GASTROINTESTINAL NEGATIVE: 1
MUSCULOSKELETAL NEGATIVE: 1
NUMBNESS: 0
CONSTITUTIONAL NEGATIVE: 1
RESPIRATORY NEGATIVE: 1
CHILLS: 0
NEUROLOGICAL NEGATIVE: 1
CARDIOVASCULAR NEGATIVE: 1
SHORTNESS OF BREATH: 0
EYES NEGATIVE: 1
FEVER: 0
COUGH: 0
LIGHT-HEADEDNESS: 0
WHEEZING: 0
PALPITATIONS: 0
ENDOCRINE NEGATIVE: 1
NECK NEGATIVE: 1

## 2025-06-18 ASSESSMENT — DUKE ACTIVITY SCORE INDEX (DASI)
CAN YOU DO LIGHT WORK AROUND THE HOUSE LIKE DUSTING OR WASHING DISHES: YES
CAN YOU HAVE SEXUAL RELATIONS: YES
DASI METS SCORE: 9.9
CAN YOU DO MODERATE WORK AROUND THE HOUSE LIKE VACUUMING, SWEEPING FLOORS OR CARRYING GROCERIES: YES
CAN YOU PARTICIPATE IN STRENOUS SPORTS LIKE SWIMMING, SINGLES TENNIS, FOOTBALL, BASKETBALL, OR SKIING: YES
CAN YOU DO HEAVY WORK AROUND THE HOUSE LIKE SCRUBBING FLOORS OR LIFTING AND MOVING HEAVY FURNITURE: YES
CAN YOU CLIMB A FLIGHT OF STAIRS OR WALK UP A HILL: YES
TOTAL_SCORE: 58.2
CAN YOU DO YARD WORK LIKE RAKING LEAVES, WEEDING OR PUSHING A MOWER: YES
CAN YOU WALK A BLOCK OR TWO ON LEVEL GROUND: YES
CAN YOU PARTICIPATE IN MODERATE RECREATIONAL ACTIVITIES LIKE GOLF, BOWLING, DANCING, DOUBLES TENNIS OR THROWING A BASEBALL OR FOOTBALL: YES
CAN YOU TAKE CARE OF YOURSELF (EAT, DRESS, BATHE, OR USE TOILET): YES
CAN YOU RUN A SHORT DISTANCE: YES
CAN YOU WALK INDOORS, SUCH AS AROUND YOUR HOUSE: YES

## 2025-06-18 ASSESSMENT — ACTIVITIES OF DAILY LIVING (ADL): ADL_SCORE: 0

## 2025-06-18 ASSESSMENT — PAIN SCALES - GENERAL: PAINLEVEL_OUTOF10: 0 - NO PAIN

## 2025-06-18 ASSESSMENT — PAIN - FUNCTIONAL ASSESSMENT: PAIN_FUNCTIONAL_ASSESSMENT: 0-10

## 2025-06-18 ASSESSMENT — LIFESTYLE VARIABLES: SMOKING_STATUS: NONSMOKER

## 2025-06-18 NOTE — PREPROCEDURE INSTRUCTIONS
Medication List            Accurate as of June 18, 2025  8:23 AM. Always use your most recent med list.                multivitamin tablet  Additional Medication Adjustments for Surgery: Take last dose 7 days before surgery     NON FORMULARY  Additional Medication Adjustments for Surgery: Take last dose 7 days before surgery     NON FORMULARY  Additional Medication Adjustments for Surgery: Take last dose 7 days before surgery     omeprazole 40 mg DR capsule  Commonly known as: PriLOSEC  Take 1 capsule (40 mg) by mouth once daily in the morning. Take before meals. Do not crush or chew. Open capsule, sprinkle beads on SF jello, pudding or applesauce.  Medication Adjustments for Surgery: Take/Use as prescribed     ondansetron ODT 4 mg disintegrating tablet  Commonly known as: Zofran-ODT  Dissolve 1 tablet (4 mg) in the mouth every 6 hours if needed for nausea or vomiting.  Medication Adjustments for Surgery: Take/Use as prescribed     oxyCODONE 5 mg immediate release tablet  Commonly known as: Roxicodone  Take 1 tablet (5 mg) by mouth every 6 hours if needed for severe pain (7 - 10) or moderate pain (4 - 6) for up to 6 days.  Medication Adjustments for Surgery: Take/Use as prescribed     progesterone 200 mg capsule  Commonly known as: Prometrium  Medication Adjustments for Surgery: Take/Use as prescribed                                Thank you for visiting [facility] today for your pre-procedure evaluation, you were seen by     ARIS Logan  Pre Admission Testing  [facility]  [phone]     This summary includes instructions and information to aid you during your perioperative period.  Please read carefully. If you have any questions about your visit today, please call the number listed above.  If you become ill or have any changes to your health before your surgery, please contact your primary care provider and alert your surgeon.    Preparing for your  Surgery       Exercises  Preoperative Deep Breathing Exercises  Why it is important to do deep breathing exercises before my surgery?  Deep breathing exercises strengthen your breathing muscles.  This helps you to recover after your surgery and decreases the chance of breathing complications.  How are the deep breathing exercises done?  Sit straight with your back supported.  Breathe in deeply and slowly through your nose. Your lower rib cage should expand and your abdomen may move forward.  Hold that breath for 3 to 5 seconds.  Breathe out through pursed lips, slowly and completely.  Rest and repeat 10 times every hour while awake.  Rest longer if you become dizzy or lightheaded.       Incentive Spirometer  You may have been provided with an incentive spirometer in [facility], please follow the below instructions.  If you were not provided an incentive spirometer in [facility], please disregard the incentive spirometer instructions for now.   You may receive an incentive spirometer at the hospital/ surgery center after your surgery.  What is an incentive spirometer?  An incentive spirometer is a device used before and after surgery to “exercise” your lungs.  It helps you to take deeper breaths to expand your lungs.  Below is an example of a basic incentive spirometer.  The device you receive may differ slightly but they all function the same.    Why do I need to use an incentive spirometer?  Using your incentive spirometer prepares your lungs for surgery and helps prevent lung problems after surgery.  How do I use my incentive spirometer?  When you're using your incentive spirometer, make sure to breathe through your mouth. If you breathe through your nose, the incentive spirometer won't work properly. You can hold your nose if you have trouble.  If you feel dizzy at any time, stop and rest. Try again at a later time.  Follow the steps below:  Set up your incentive spirometer, expand the flexible tubing and connect to the  outlet.  Sit upright in a chair or bed. Hold the incentive spirometer at eye level.   Put the mouthpiece in your mouth and close your lips tightly around it. Slowly breathe out (exhale) completely.  Breathe in (inhale) slowly through your mouth as deeply as you can. As you take a breath, you will see the piston rise inside the large column. While the piston rises, the indicator should move upwards. It should stay in between the 2 arrows (see Figure).  Try to get the piston as high as you can, while keeping the indicator between the arrows.   If the indicator doesn't stay between the arrows, you're breathing either too fast or too slow.  When you get it as high as you can, hold your breath for 10 seconds, or as long as possible. While you're holding your breath, the piston will slowly fall to the base of the spirometer.  Once the piston reaches the bottom of the spirometer, breathe out slowly through your mouth. Rest for a few seconds.  Repeat 10 times. Try to get the piston to the same level with each breath.  Repeat every hour while awake  You can carefully clean the outside of the mouthpiece with an alcohol wipe or soap and water.      Preoperative Brain Exercises    What are brain exercises?  A brain exercise is any activity that engages your thinking (cognitive) skills.    What types of activities are considered brain exercises?  Jigsaw puzzles, crossword puzzles, word jumble, memory games, word search, and many more.  Many can be found free online or on your phone via a mobile erick.    Why should I do brain exercises before my surgery?  More recent research has shown brain exercise before surgery can lower the risk of postoperative delirium (confusion) which can be especially important for older adults.  Patients who did brain exercises for 5 to 10 hours the days before surgery, cut their risk of postoperative delirium in half up to 1 week after surgery.    Sit-to-Stand Exercise    What is the sit-to-stand  exercise?  The sit-to-stand exercise strengthens the muscles of your lower body and muscles in the center of your body (core muscles for stability) helping to maintain and improve your strength and mobility.  How do I do the sit-to-stand exercise?  The goal is to do this exercise without using your arms or hands.  If this is too difficult, use your arms and hands or a chair with armrests to help slowly push yourself to the standing position and lower yourself back to the sitting position. As the movement becomes easier use your arms and hands less.    Steps to the sit-to-stand exercise  Sit up tall in a sturdy chair, knees bent, feet flat on the floor shoulder-width apart.  Shift your hips/pelvis forward in the chair to correctly position yourself for the next movement.  Lean forward at your hips.  Stand up straight putting equal weight on both feet.  Check to be sure you are properly aligned with the chair, in a slow controlled movement sit back down.  Repeat this exercise 10-15 times.  If needed you can do it fewer times until your strength improves.  Rest for 1 minute.  Do another 10-15 sit-to-stand exercises.  Try to do this in the morning and evening.        Instructions    Preoperative Fasting Guidelines    Why must I stop eating and drinking near surgery time?  With sedation, food or liquid in your stomach can enter your lungs causing serious complications  Food can increase nausea and vomiting  When do I need to stop eating and drinking before my surgery?      Do not eat any food after midnight the night before your surgery/procedure. You may have up to 13.5 ounces of clear liquid until TWO hours before your instructed arrival time to the hospital.  This includes water, black tea/coffee, (no milk or cream) apple juice, and electrolyte drinks (Gatorade). You may chew gum until TWO hours before your surgery/procedure            Simple things you can do to help prevent blood clots     Blood clots are blockages  that can form in the body's veins. When a blood clot forms in your deep veins, it may be called a deep vein thrombosis, or DVT for short. Blood clots can happen in any part of the body where blood flows, but they are most common in the arms and legs. If a piece of a blood clot breaks free and travels to the lungs, it is called a pulmonary embolus (PE). A PE can be a very serious problem.         Being in the hospital or having surgery can raise your chances of getting a blood clot because you may not be well enough to move around as much as you normally do.         Ways you can help prevent blood clots in the hospital       Wearing SCDs  SCDs stands for Sequential Compression Devices.   SCDs are special sleeves that wrap around your legs. They attach to a pump that fills them with air to gently squeeze your legs every few minutes.  This helps return the blood in your legs to your heart.   SCDs should only be taken off when walking or bathing. SCDs may not be comfortable, but they can help save your life.              Pump SCD leg sleeves  Wearing compression stockings - if your doctor orders them. These special snug-fitting stockings gently squeeze your legs to help blood flow.       Walking. Walking helps move the blood in your legs.   If your doctor says it is ok, try walking the halls at least   5 times a day. Ask us to help you get up, so you don't fall.      Taking any blood-thinning medicines your doctor orders.              Ways you can help prevent blood clots at home         Wearing compression stockings - if your doctor orders them.   Walking - to help move the blood in your legs.    Taking any blood-thinning medicines your doctor orders.      Signs of a blood clot or PE    Tell your doctor or nurse right away if you have any of the problems listed below.         If you are at home, seek medical care right away. Call 911 for chest pain or problems breathing.            Signs of a blood clot (DVT) - such as  pain, swelling, redness, or warmth in your arm or legs.  Signs of a pulmonary embolism (PE) - such as chest pain or feeling short of breath      Tobacco and Alcohol;  Do not drink alcohol or smoke within 24 hours of surgery.  It is best to quit smoking for as long as possible before any surgery or procedure.    Quitting Smoking; Recognizing Dangerous Situations:  1-Alcohol use during the first month after quitting, 2-Being around smoke or someone who smokes 3-Times situation routinely smoked  4-Triggers-car, breaks, coffee, when awakening, social events  Coping Skills: 1-Learning new ways to manage stress 2-Exercising 3-Relaxation breathing   4-Change routines 5-Distraction techniques    Websites:  Smoke-Free - offers free text messages and an erick to help you quit. Info includes eating and mood issues that may come with quitting. There is a Live Helpline to talk to an expert. Go to smokefree.gov; Become an Ex-Smoker - the free EX Plan is based on scientific research and useful advice from ex-smokers. It isn't just about quitting smoking.  It's about re-learning life without cigarettes using a 3-step program.  Go to becomeanex.org   Centers for Disease Control offer many suggestions for helping you quit. Includes a Quit Guide and real-life stories. There are sections for specific groups such as LGBT, , different ethnic groups, and pregnant women.  Go to cdc.gov/tobacco/campaign/tips    Other Resources:  [place] Tobacco Quit Line - call 1-800-QUIT-NOW or 1-990.716.5661.  [facility] 2-1-1 - to find local programs and resources. Call 211 or go to 211.org.  [facility] Tobacco Cessation Program - call 998-465-3825.  American Lung Association offers classes for quitting smoking. Some places may charge a fee. For a list of classes, go to lung.org or call 1-800-LUNG-[place].     Some things to think about:; The health benefits of quitting smoking can help most of the major parts of your body. There is no safe  amount of cigarette smoke. Quitting smoking can add years to your life. When you quit, you'll also protect your loved ones from dangerous secondhand smoke. Make a plan, join a support group, and talk to your physician to assist in quitting smoking.                                                                                                              , Quitting Alcohol; Things to think about: Alcohol use disorder is a health condition that can improve with treatment. Each person is unique. A treatment that is good for one person may not be a good fit for someone else. Simply knowing the different options can be an important first step. Treatment can be inpatient, where you stay at a facility, or outpatient, where you stay at home. Your insurance plan may cover some treatment costs.   Resources:    NIAAA Alcohol Treatment Navigator - from the National [place] on Alcohol Abuse and  Alcoholism. Offers an online tool to help you find the right treatment for you - near you. Go to alcoholtreatment.niaaa.nih.gov.  [facility] National Hotline  - from the Substance Abuse and Mental Health Services Administration. A free, confidential 24-hour treatment referral and information service for anyone facing mental and/or substance use disorders. Go to findtreatment.gov or call 7-947-435-HELP (6115).    Alcoholics Anonymous (AA) - offers group meetings and a 12-step program to anyone who has a drinking problem. Go to aa.org or call 165-776-1713    [facility] 2-1-1 - to find local programs and resources. Call 211 or go to [place].SeeYourImpact.org    Other people you can talk to about treatment options include your primary care doctor, health insurance plan, local health department, or employee assistance program. You can also ask to talk to a hospital .          Other Instructions    CONTACT SURGEON'S OFFICE IF YOU DEVELOP:  * Fever = 100.4 F   * New respiratory symptoms (e.g. cough, shortness of breath, respiratory distress, sore  throat)  * Recent loss of taste or smell  *Flu like symptoms such as headache, fatigue or gastrointestinal symptoms  * You develop any open sores, shingles, burning or painful urination   AND/OR:  * You no longer wish to have the surgery.  * Any other personal circumstances change that may lead to the need to cancel or defer this surgery.  *You were admitted to any hospital within one week of your planned procedure.    SURGICAL TIME:  *You will be contacted between 2 p.m. and 3 p.m. the business day before your surgery with your arrival time.  *If you haven't received a call by 3pm, call [phone]   *Scheduled surgery times may change and you will be notified if this occurs - check your personal voicemail for any updates.     ON THE MORNING OF SURGERY:  *Wear comfortable, loose fitting clothing.   *Do not use moisturizers, creams, lotions or perfume.  *All jewelry and valuables should be left at home.  *Prosthetic devices such as contact lenses, hearing aids, dentures, eyelash extensions, hairpins and body piercing must be removed before surgery.     BRING WITH YOU:  *Photo ID and insurance card  *Current list of medications and allergies  *Pacemaker/Defibrillator/Heart stent cards  *CPAP machine and mask  *Slings/splints/crutches  *Copy of your complete Advanced Directive/DHPOA-if applicable  *Neurostimulator implant remote       The Week before Surgery        Seven days before Surgery  Check your PAT medication instructions  Do the exercises provided to you by [facility]   Arrange for a responsible, adult licensed  to take you home after surgery and stay with you for 24 hours.  You will not be permitted to drive yourself home if you have received any anesthetic/sedation  Six days before surgery  Check your PAT medication instructions  Do the exercises provided to you by [facility]   Start using Chlorhexidene (CHG) body wash if prescribed  Five days before surgery  Check your PAT medication instructions  Do the  exercises provided to you by [facility]   Continue to use CHG body wash if prescribed  Three days before surgery  Check your PAT medication instructions  Do the exercises provided to you by PAT   Continue to use CHG body wash if prescribed  Two days before surgery  Check your PAT medication instructions  Do the exercises provided to you by PAT   Continue to use CHG body wash if prescribed    The Day before Surgery       Check your PAT medication and all other PAT instructions including when to stop eating and drinking  You will be called with your arrival time for surgery in the late afternoon.  If you do not receive a call please reach out to your surgeon's office.  Do not smoke or drink 24 hours before surgery  Prepare items to bring with you to the hospital  Shower with your chlorhexidine wash if prescribed  Brush your teeth and use your chlorhexidine dental rinse if prescribed    The Day of Surgery       Check your PAT medication instructions  Ensure you follow the instructions for when to stop eating and drinking  Shower, if prescribed use CHG.  Do not apply any lotions, creams, moisturizers, perfume or deodorant  Brush your teeth and use your CHG dental rinse if prescribed  Wear loose comfortable clothing  Avoid make-up  Remove  jewelry and piercings, consider professional piercing removal with a plastic spacer if needed  Bring photo ID and Insurance card  Bring an accurate medication list that includes medication dose, frequency and allergies  Bring a copy of your advanced directives (will, health care power of )  Bring any devices and controllers as well as medical devices you have been provided with for surgery (CPAP, slings, braces, etc.)  Dentures, eyeglasses, and contacts will be removed before surgery, please bring cases for contacts or glasses

## 2025-06-18 NOTE — CPM/PAT H&P
"[facility]/PAT Evaluation       Name: Lupe Schmidt (Lupe Schmidt \"Calin\")  /Age: 1965/59 y.o.     Visit Type:   In-Person         HPI  Chief Complaint: Patient is a 59 y.o. female scheduled for gastric sleeve on 25 with Dr. Braden secondary to morbid obesity. Pt has a past medical history that includes obesity, abnormal glucose without DM, hyperlipidemia, OA, hypothyroid (now euthyroid without meds). Patient presents today to [facility] for perioperative risk stratification and optimization.      History of bleeding of clotting problems: no  Hx of DVT, PE, MI, CVA: no  Hx of problems with anesthesia: no  Hx of blood transfusion: no  Would accept blood transfusion: yes  Hx of implanted devices: no  Hx of metal in neck or dec neck ROM: no  Problems with teeth: no  Recent illness: no  Open areas on skin: no     Feeling well.   No complaints.     Past Medical History:   Diagnosis Date    Arthritis     Bariatric surgery status     Chronic joint pain     Chronic joint pain     Class 3 severe obesity with body mass index (BMI) of 40.0 to 44.9 in adult 2025    41.68 kg/m²    Encounter for pre-operative cardiovascular clearance 2025    Gualberto Mackenzie MD  Physician Cardiology    Encounter for vitamin deficiency screening     Hyperlipidemia     Hypothyroidism     Subclinical hypothyroidism    Mild sleep apnea 2025    Philip Moise MD Sleep Medicine, CPAP not required    Morbid obesity (Multi)     Varicella 1976    Wears glasses        Past Surgical History:   Procedure Laterality Date     SECTION, LOW TRANSVERSE  , ,     COLONOSCOPY      EYE SURGERY  LASIK     WISDOM TOOTH EXTRACTION         Patient  reports being sexually active and has had partner(s) who are male. She reports using the following methods of birth control/protection: Post-menopausal and Female Sterilization.    Family History   Problem Relation Name Age of Onset    Arthritis Mother DEMETRIA GUARDADO  "    Alcohol abuse Father AASHISH MONTGOMERY SR        Allergies   Allergen Reactions    Sulfa (Sulfonamide Antibiotics) Shortness of breath       Prior to Admission medications    Medication Sig Start Date End Date Taking? Authorizing Provider   multivitamin tablet Take 1 tablet by mouth once daily.   Yes Historical Provider, MD   NON FORMULARY Insert 1 each into the vagina once daily. DHEA, ESTRADIOL, PROGESTERONE CREAM 12/18/24  Yes Historical Provider, MD   NON FORMULARY Inject 1 each under the skin 1 (one) time per week. Pt states she takes a GLP-1 compound from [facility] once weekly on Saturdays. Current dose is 18 units    Last dose 6/12/25   Yes Historical Provider, MD   progesterone (Prometrium) 200 mg capsule Take 1 capsule (200 mg) by mouth once daily. 12/18/24  Yes Historical Provider, MD   omeprazole (PriLOSEC) 40 mg DR capsule Take 1 capsule (40 mg) by mouth once daily in the morning. Take before meals. Do not crush or chew. Open capsule, sprinkle beads on SF jello, pudding or applesauce. 6/13/25   José Braden MD   ondansetron ODT (Zofran-ODT) 4 mg disintegrating tablet Dissolve 1 tablet (4 mg) in the mouth every 6 hours if needed for nausea or vomiting. 6/13/25   José Braden MD   oxyCODONE (Roxicodone) 5 mg immediate release tablet Take 1 tablet (5 mg) by mouth every 6 hours if needed for severe pain (7 - 10) or moderate pain (4 - 6) for up to 6 days. 6/13/25 6/19/25  José Braden MD        PAT ROS:   Constitutional:   neg     no fever   no chills  Neuro/Psych:   neg     no numbness   no weakness   no light-headedness  Eyes:   neg    Ears:   neg    Nose:   neg    Mouth:   neg    Throat:   neg    Neck:   neg    Cardio:   neg     no chest pain   no palpitations   no peripheral edema  Respiratory:   neg     no cough   no wheezing   no shortness of breath  Endocrine:   neg    GI:   neg    :   neg    Musculoskeletal:   neg    Hematologic:   Skin:  neg        Physical Exam  Vitals reviewed.  "  Constitutional:       General: She is not in acute distress.     Appearance: Normal appearance. She is obese. She is not ill-appearing, toxic-appearing or diaphoretic.   HENT:      Head: Normocephalic and atraumatic.      Mouth/Throat:      Mouth: Mucous membranes are moist.   Eyes:      General: No scleral icterus.        Right eye: No discharge.         Left eye: No discharge.      Conjunctiva/sclera: Conjunctivae normal.   Neck:      Vascular: No carotid bruit.   Cardiovascular:      Rate and Rhythm: Normal rate and regular rhythm.      Pulses: Normal pulses.      Heart sounds: Normal heart sounds. No murmur heard.     No friction rub. No gallop.   Pulmonary:      Effort: Pulmonary effort is normal. No respiratory distress.      Breath sounds: Normal breath sounds.   Abdominal:      General: Bowel sounds are normal.      Palpations: Abdomen is soft.   Musculoskeletal:      Cervical back: Normal range of motion and neck supple. No rigidity.      Right lower leg: No edema.      Left lower leg: No edema.   Skin:     General: Skin is warm and dry.   Neurological:      Mental Status: She is alert and oriented to person, place, and time.      Motor: No weakness.      Gait: Gait normal.   Psychiatric:         Mood and Affect: Mood normal.         Behavior: Behavior normal.         Thought Content: Thought content normal.         Judgment: Judgment normal.          PAT AIRWAY:   Airway:     Mallampati::  I    TM distance::  >3 FB    Neck ROM::  Full      Testing/Diagnostic:     Patient Specialist/PCP:     Visit Vitals  /85   Pulse 80   Temp 36 °C (96.8 °F)   Resp 18   Ht 1.6 m (5' 3\")   Wt 103 kg (226 lb 13.7 oz)   SpO2 95%   BMI 40.19 kg/m²   OB Status Postmenopausal   Smoking Status Never   BSA 2.14 m²       DASI Risk Score      Flowsheet Row Pre-Admission Testing from 6/18/2025 in [facility]   Can you take care of yourself (eat, dress, bathe, or use toilet)?  2.75 filed at 06/18/2025 0805   Can " you walk indoors, such as around your house? 1.75 filed at 06/18/2025 0805   Can you walk a block or two on level ground?  2.75 filed at 06/18/2025 0805   Can you climb a flight of stairs or walk up a hill? 5.5 filed at 06/18/2025 0805   Can you run a short distance? 8 filed at 06/18/2025 0805   Can you do light work around the house like dusting or washing dishes? 2.7 filed at 06/18/2025 0805   Can you do moderate work around the house like vacuuming, sweeping floors or carrying groceries? 3.5 filed at 06/18/2025 0805   Can you do heavy work around the house like scrubbing floors or lifting and moving heavy furniture?  8 filed at 06/18/2025 0805   Can you do yard work like raking leaves, weeding or pushing a mower? 4.5 filed at 06/18/2025 0805   Can you have sexual relations? 5.25 filed at 06/18/2025 0805   Can you participate in moderate recreational activities like golf, bowling, dancing, doubles tennis or throwing a baseball or football? 6 filed at 06/18/2025 0805   Can you participate in strenous sports like swimming, singles tennis, football, basketball, or skiing? 7.5 filed at 06/18/2025 0805   DASI SCORE 58.2 filed at 06/18/2025 0805   METS Score (Will be calculated only when all the questions are answered) 9.9 filed at 06/18/2025 0805          Caprini DVT Assessment      Flowsheet Row Pre-Admission Testing from 6/18/2025 in [facility]   DVT Score (IF A SCORE IS NOT CALCULATING, MUST SELECT A BMI TO COMPLETE) 7 filed at 06/18/2025 0826   Surgical Factors Major surgery planned, including arthroscopic and laproscopic (1-2 hours) filed at 06/18/2025 0826   Women Oral contraceptives filed at 06/18/2025 0826   BMI (BMI MUST BE CHOSEN) 31-40 (Obesity) filed at 06/18/2025 0826          Modified Frailty Index      Flowsheet Row Pre-Admission Testing from 6/18/2025 in [facility]   Non-independent functional status (problems with dressing, bathing, personal grooming, or cooking) 0 filed at  06/18/2025 0827   History of diabetes mellitus  0 filed at 06/18/2025 0827   History of COPD 0 filed at 06/18/2025 0827   History of CHF No filed at 06/18/2025 0827   History of MI 0 filed at 06/18/2025 0827   History of Percutaneous Coronary Intervention, Cardiac Surgery, or Angina No filed at 06/18/2025 0827   Hypertension requiring the use of medication  0 filed at 06/18/2025 0827   Peripheral vascular disease 0 filed at 06/18/2025 0827   Impaired sensorium (cognitive impairement or loss, clouding, or delirium) 0 filed at 06/18/2025 0827   TIA or CVA withouy residual deficit 0 filed at 06/18/2025 0827   Cerebrovascular accident with deficit 0 filed at 06/18/2025 0827   Modified Frailty Index Calculator 0 filed at 06/18/2025 0827          [id number] Stroke Risk Points  Current as of 4 minutes ago        N/A 0 to 9 Points:      Last Change: N/A          The [id number] risk score (Lip CAROLA, et al. 2009. © 2010 [employer]) quantifies the risk of stroke for a patient with atrial fibrillation. For patients without atrial fibrillation or under the age of 18 this score appears as N/A. Higher score values generally indicate higher risk of stroke.        This score is not applicable to this patient. Components are not calculated.          Revised Cardiac Risk Index      Flowsheet Row Pre-Admission Testing from 6/18/2025 in [facility]   High-Risk Surgery (Intraperitoneal, Intrathoracic,Suprainguinal vascular) 1 filed at 06/18/2025 0826   History of ischemic heart disease (History of MI, History of positive exercuse test, Current chest paint considered due to myocardial ischemia, Use of nitrate therapy, ECG with pathological Q Waves) 0 filed at 06/18/2025 0826   History of congestive heart failure (pulmonary edemia, bilateral rales or S3 gallop, Paroxysmal nocturnal dyspnea, CXR showing pulmonary vascular redistribution) 0 filed at 06/18/2025 0826   History of cerebrovascular disease  (Prior TIA or stroke) 0 filed at 2025   Pre-operative insulin treatment 0 filed at 2025   Pre-operative creatinine>2 mg/dl 0 filed at 2025   Revised Cardiac Risk Calculator 1 filed at 2025          Apfel Simplified Score      Flowsheet Row Pre-Admission Testing from 2025 in [facility] [place]   Smoking status 1 filed at 2025   History of motion sickness or PONV  1 filed at 2025   Use of postoperative opioids 1 filed at 2025   Gender - Female 1=Yes filed at 2025   Apfel Simplified Score Calculator 4 filed at 2025          Risk Analysis Index Results This Encounter         2025             Do you live in a place other than your own home?: 0    When did you begin living in the place you are currently residing?: Greater than one year ago    Any kidney failure, kidney not working well, or seeing a kidney doctor (nephrologist)? If yes, was this for kidney stones or another problem?: 0 No    Any history of chronic (long-term) congestive heart failure (CHF)?: 0 No    Any shortness of breath when resting?: 0 No    In the past five years, have you been diagnosed with or treated for cancer?: No    During the last 3 months has it become difficult for you to remember things or organize your thoughts?: 0 No    Have you lost weight of 10 pounds or more in the past 3 months without trying?: 0 No    Do you have any loss of appetitie?: 0 No    Getting Around (Mobility): 0 Can get around without help    Eatin Can plan and prepare own meals    Toiletin Can use toilet without any help    Personal Hygiene (Bathing, Hand Washing, Changing Clothes): 0 Can shower or bathe without any help    MEIER Cancer History: Patient does not indicate history of cancer    Total Risk Analysis Index Score Without Cancer: 16    Total Risk Analysis Index Score: 16          Stop Bang Score      Flowsheet Row Pre-Admission Testing  from 6/18/2025 in [facility]   Do you snore loudly? 1 filed at 06/18/2025 0805   Do you often feel tired or fatigued after your sleep? 0 filed at 06/18/2025 0805   Has anyone ever observed you stop breathing in your sleep? 0 filed at 06/18/2025 0805   Do you have or are you being treated for high blood pressure? 0 filed at 06/18/2025 0805   Recent BMI (Calculated) 40.7 filed at 06/18/2025 0805   Is BMI greater than 35 kg/m2? 1=Yes filed at 06/18/2025 0805   Age older than 50 years old? 1=Yes filed at 06/18/2025 0805   Is your neck circumference greater than 17 inches (Male) or 16 inches (Female)? 1 filed at 06/18/2025 0805   Gender - Male 0=No filed at 06/18/2025 0805   STOP-BANG Total Score 4 filed at 06/18/2025 0805          Prodigy: High Risk  Total Score: 3              Prodigy Previous Opioid Use Score           ARISCAT Score for Postoperative Pulmonary Complications      Flowsheet Row Pre-Admission Testing from 6/18/2025 in [facility]   Age Calculated Score 3 filed at 06/18/2025 0828   Preoperative SpO2 0 filed at 06/18/2025 0828   Respiratory infection in the last month Either upper or lower (i.e., URI, bronchitis, pneumonia), with fever and antibiotic treatment 0 filed at 06/18/2025 0828   Preoperative anemia (Hgb less than 10 g/dl) 0 filed at 06/18/2025 0828   Surgical incision  15 filed at 06/18/2025 0828   Duration of surgery  0 filed at 06/18/2025 0828   Emergency Procedure  0 filed at 06/18/2025 0828   ARISCAT Total Score  18 filed at 06/18/2025 0828          Berman Perioperative Risk for Myocardial Infarction or Cardiac Arrest (MORAIMA)      Flowsheet Row Pre-Admission Testing from 6/18/2025 in [facility]   Calculated Age Score 1.14 filed at 06/18/2025 0828   Functional Status  0 filed at 06/18/2025 0828   ASA Class  -3.29 filed at 06/18/2025 0828   Creatinine 0 filed at 06/18/2025 0828   Type of Procedure  -0.25 filed at 06/18/2025 0828   MORAIMA Total Score  -7.65  filed at 06/18/2025 0828   MORAIMA % 0.05 filed at 06/18/2025 0828            Assessment and Plan:     Chief Complaint: Patient is a 59 y.o. female scheduled for gastric sleeve on 7/1/25 with Dr. Braden secondary to morbid obesity. Pt has a past medical history that includes obesity, abnormal glucose without DM, hyperlipidemia, OA, hypothyroid (now euthyroid without meds). Patient presents today to [facility] for perioperative risk stratification and optimization.          Neuro:  No neurologic diagnosis, however, the patient is at increased risk for perioperative delirium secondary to  type and duration of surgery, Patient instructed on and provided cognitive exercises  Patient is at increased risk for perioperative CVA secondary to  Hx Hyperlipidemia    HEENT:  No HEENT diagnosis or significant findings on chart review or clinical presentation and evaluation. No further preoperative testing/intervention indicated at this time.    Cardiovascular:  No CV diagnosis or significant findings on chart review or clinical presentation and evaluation. No further preoperative testing or intervention is indicated at this time.  METS: 9.9  RCRI: 1 point, 6.0% risk for postoperative MACE   MORAIMA: 0.05% risk for 30 day postoperative MACE  EKG - performed 4/1/25.  NSR            Pulmonary:  No pulmonary diagnosis, however patient is at increased risk of perioperative complications secondary to  obesity  Stop Bang score is 4 placing patient at high risk for SILVA  ARISCAT: <26 points, 1.6% risk of in-hospital postoperative pulmonary complication  PRODIGY: Moderate risk for opioid induced respiratory depression  Pumonary toilet education discussed, patient also provided deep breathing exercises and incentive spirometry educational handout    Renal:   No renal diagnosis, however patient is at increase risk for perioperative renal complications secondary to  Age equal to or greater than 56, BMI equal to or greater than  30      Endocrine:  No endocrine diagnosis or significant findings on chart review or clinical presentation and evaluation. No further testing or intervention is indicated at this time.    Hematologic:  No hematologic diagnosis, however patient is at an increased risk for DVT  Caprini Score 7, patient at High risk for perioperative DVT.  Patient provided with VTE education/handout.    Antiplatelet management   The patient is not currently receiving antiplatelet therapy.  Anticoagulation management  The patient is not currently receiving anticoagulation therapy. Patient provided with DVT educational handout.      Gastrointestinal:   No GI diagnosis or significant findings on chart review or clinical presentation and evaluation.   Eat-10 score 0  Apfel 4    Infectious disease:   No infectious diagnosis or significant findings on chart review or clinical presentation and evaluation.       Musculoskeletal:   No diagnosis or significant findings on chart review or clinical presentation and evaluation.     Anesthesia/Airway:  No anesthesia complications      Medication instructions and NPO guidelines reviewed with the patient.  All questions or concerns discussed and addressed.         EKG reviewed in clinic.   Blood work obtained in clinic.   Urine obtained in clinic.   CXR ordered and pt escorted to radiology to complete this after visit in office.     Betzaida Hurtado, APRN-CNP

## 2025-06-18 NOTE — H&P (VIEW-ONLY)
"[facility]/PAT Evaluation       Name: Lupe Schmidt (Lupe Schmidt \"Calin\")  /Age: 1965/59 y.o.     Visit Type:   In-Person         HPI  Chief Complaint: Patient is a 59 y.o. female scheduled for gastric sleeve on 25 with Dr. Braden secondary to morbid obesity. Pt has a past medical history that includes obesity, abnormal glucose without DM, hyperlipidemia, OA, hypothyroid (now euthyroid without meds). Patient presents today to [facility] for perioperative risk stratification and optimization.      History of bleeding of clotting problems: no  Hx of DVT, PE, MI, CVA: no  Hx of problems with anesthesia: no  Hx of blood transfusion: no  Would accept blood transfusion: yes  Hx of implanted devices: no  Hx of metal in neck or dec neck ROM: no  Problems with teeth: no  Recent illness: no  Open areas on skin: no     Feeling well.   No complaints.     Past Medical History:   Diagnosis Date    Arthritis     Bariatric surgery status     Chronic joint pain     Chronic joint pain     Class 3 severe obesity with body mass index (BMI) of 40.0 to 44.9 in adult 2025    41.68 kg/m²    Encounter for pre-operative cardiovascular clearance 2025    Gualberto Mackenzie MD  Physician Cardiology    Encounter for vitamin deficiency screening     Hyperlipidemia     Hypothyroidism     Subclinical hypothyroidism    Mild sleep apnea 2025    Philip Moise MD Sleep Medicine, CPAP not required    Morbid obesity (Multi)     Varicella 1976    Wears glasses        Past Surgical History:   Procedure Laterality Date     SECTION, LOW TRANSVERSE  , ,     COLONOSCOPY      EYE SURGERY  LASIK     WISDOM TOOTH EXTRACTION         Patient  reports being sexually active and has had partner(s) who are male. She reports using the following methods of birth control/protection: Post-menopausal and Female Sterilization.    Family History   Problem Relation Name Age of Onset    Arthritis Mother DEMETRIA GUARDADO  "    Alcohol abuse Father AASHISH MONTGOMERY SR        Allergies   Allergen Reactions    Sulfa (Sulfonamide Antibiotics) Shortness of breath       Prior to Admission medications    Medication Sig Start Date End Date Taking? Authorizing Provider   multivitamin tablet Take 1 tablet by mouth once daily.   Yes Historical Provider, MD   NON FORMULARY Insert 1 each into the vagina once daily. DHEA, ESTRADIOL, PROGESTERONE CREAM 12/18/24  Yes Historical Provider, MD   NON FORMULARY Inject 1 each under the skin 1 (one) time per week. Pt states she takes a GLP-1 compound from [facility] once weekly on Saturdays. Current dose is 18 units    Last dose 6/12/25   Yes Historical Provider, MD   progesterone (Prometrium) 200 mg capsule Take 1 capsule (200 mg) by mouth once daily. 12/18/24  Yes Historical Provider, MD   omeprazole (PriLOSEC) 40 mg DR capsule Take 1 capsule (40 mg) by mouth once daily in the morning. Take before meals. Do not crush or chew. Open capsule, sprinkle beads on SF jello, pudding or applesauce. 6/13/25   José Braden MD   ondansetron ODT (Zofran-ODT) 4 mg disintegrating tablet Dissolve 1 tablet (4 mg) in the mouth every 6 hours if needed for nausea or vomiting. 6/13/25   José Braden MD   oxyCODONE (Roxicodone) 5 mg immediate release tablet Take 1 tablet (5 mg) by mouth every 6 hours if needed for severe pain (7 - 10) or moderate pain (4 - 6) for up to 6 days. 6/13/25 6/19/25  José Braden MD        PAT ROS:   Constitutional:   neg     no fever   no chills  Neuro/Psych:   neg     no numbness   no weakness   no light-headedness  Eyes:   neg    Ears:   neg    Nose:   neg    Mouth:   neg    Throat:   neg    Neck:   neg    Cardio:   neg     no chest pain   no palpitations   no peripheral edema  Respiratory:   neg     no cough   no wheezing   no shortness of breath  Endocrine:   neg    GI:   neg    :   neg    Musculoskeletal:   neg    Hematologic:   Skin:  neg        Physical Exam  Vitals reviewed.  "  Constitutional:       General: She is not in acute distress.     Appearance: Normal appearance. She is obese. She is not ill-appearing, toxic-appearing or diaphoretic.   HENT:      Head: Normocephalic and atraumatic.      Mouth/Throat:      Mouth: Mucous membranes are moist.   Eyes:      General: No scleral icterus.        Right eye: No discharge.         Left eye: No discharge.      Conjunctiva/sclera: Conjunctivae normal.   Neck:      Vascular: No carotid bruit.   Cardiovascular:      Rate and Rhythm: Normal rate and regular rhythm.      Pulses: Normal pulses.      Heart sounds: Normal heart sounds. No murmur heard.     No friction rub. No gallop.   Pulmonary:      Effort: Pulmonary effort is normal. No respiratory distress.      Breath sounds: Normal breath sounds.   Abdominal:      General: Bowel sounds are normal.      Palpations: Abdomen is soft.   Musculoskeletal:      Cervical back: Normal range of motion and neck supple. No rigidity.      Right lower leg: No edema.      Left lower leg: No edema.   Skin:     General: Skin is warm and dry.   Neurological:      Mental Status: She is alert and oriented to person, place, and time.      Motor: No weakness.      Gait: Gait normal.   Psychiatric:         Mood and Affect: Mood normal.         Behavior: Behavior normal.         Thought Content: Thought content normal.         Judgment: Judgment normal.          PAT AIRWAY:   Airway:     Mallampati::  I    TM distance::  >3 FB    Neck ROM::  Full      Testing/Diagnostic:     Patient Specialist/PCP:     Visit Vitals  /85   Pulse 80   Temp 36 °C (96.8 °F)   Resp 18   Ht 1.6 m (5' 3\")   Wt 103 kg (226 lb 13.7 oz)   SpO2 95%   BMI 40.19 kg/m²   OB Status Postmenopausal   Smoking Status Never   BSA 2.14 m²       DASI Risk Score      Flowsheet Row Pre-Admission Testing from 6/18/2025 in [facility] [place]   Can you take care of yourself (eat, dress, bathe, or use toilet)?  2.75 filed at 06/18/2025 0805   Can " you walk indoors, such as around your house? 1.75 filed at 06/18/2025 0805   Can you walk a block or two on level ground?  2.75 filed at 06/18/2025 0805   Can you climb a flight of stairs or walk up a hill? 5.5 filed at 06/18/2025 0805   Can you run a short distance? 8 filed at 06/18/2025 0805   Can you do light work around the house like dusting or washing dishes? 2.7 filed at 06/18/2025 0805   Can you do moderate work around the house like vacuuming, sweeping floors or carrying groceries? 3.5 filed at 06/18/2025 0805   Can you do heavy work around the house like scrubbing floors or lifting and moving heavy furniture?  8 filed at 06/18/2025 0805   Can you do yard work like raking leaves, weeding or pushing a mower? 4.5 filed at 06/18/2025 0805   Can you have sexual relations? 5.25 filed at 06/18/2025 0805   Can you participate in moderate recreational activities like golf, bowling, dancing, doubles tennis or throwing a baseball or football? 6 filed at 06/18/2025 0805   Can you participate in strenous sports like swimming, singles tennis, football, basketball, or skiing? 7.5 filed at 06/18/2025 0805   DASI SCORE 58.2 filed at 06/18/2025 0805   METS Score (Will be calculated only when all the questions are answered) 9.9 filed at 06/18/2025 0805          Caprini DVT Assessment      Flowsheet Row Pre-Admission Testing from 6/18/2025 in [facility]   DVT Score (IF A SCORE IS NOT CALCULATING, MUST SELECT A BMI TO COMPLETE) 7 filed at 06/18/2025 0826   Surgical Factors Major surgery planned, including arthroscopic and laproscopic (1-2 hours) filed at 06/18/2025 0826   Women Oral contraceptives filed at 06/18/2025 0826   BMI (BMI MUST BE CHOSEN) 31-40 (Obesity) filed at 06/18/2025 0826          Modified Frailty Index      Flowsheet Row Pre-Admission Testing from 6/18/2025 in [facility]   Non-independent functional status (problems with dressing, bathing, personal grooming, or cooking) 0 filed at  06/18/2025 0827   History of diabetes mellitus  0 filed at 06/18/2025 0827   History of COPD 0 filed at 06/18/2025 0827   History of CHF No filed at 06/18/2025 0827   History of MI 0 filed at 06/18/2025 0827   History of Percutaneous Coronary Intervention, Cardiac Surgery, or Angina No filed at 06/18/2025 0827   Hypertension requiring the use of medication  0 filed at 06/18/2025 0827   Peripheral vascular disease 0 filed at 06/18/2025 0827   Impaired sensorium (cognitive impairement or loss, clouding, or delirium) 0 filed at 06/18/2025 0827   TIA or CVA withouy residual deficit 0 filed at 06/18/2025 0827   Cerebrovascular accident with deficit 0 filed at 06/18/2025 0827   Modified Frailty Index Calculator 0 filed at 06/18/2025 0827          [id number] Stroke Risk Points  Current as of 4 minutes ago        N/A 0 to 9 Points:      Last Change: N/A          The [id number] risk score (Lip CAROLA, et al. 2009. © 2010 American College [employer]) quantifies the risk of stroke for a patient with atrial fibrillation. For patients without atrial fibrillation or under the age of 18 this score appears as N/A. Higher score values generally indicate higher risk of stroke.        This score is not applicable to this patient. Components are not calculated.          Revised Cardiac Risk Index      Flowsheet Row Pre-Admission Testing from 6/18/2025 in [facility]   High-Risk Surgery (Intraperitoneal, Intrathoracic,Suprainguinal vascular) 1 filed at 06/18/2025 0826   History of ischemic heart disease (History of MI, History of positive exercuse test, Current chest paint considered due to myocardial ischemia, Use of nitrate therapy, ECG with pathological Q Waves) 0 filed at 06/18/2025 0826   History of congestive heart failure (pulmonary edemia, bilateral rales or S3 gallop, Paroxysmal nocturnal dyspnea, CXR showing pulmonary vascular redistribution) 0 filed at 06/18/2025 0826   History of cerebrovascular disease  (Prior TIA or stroke) 0 filed at 2025   Pre-operative insulin treatment 0 filed at 2025   Pre-operative creatinine>2 mg/dl 0 filed at 2025   Revised Cardiac Risk Calculator 1 filed at 2025          Apfel Simplified Score      Flowsheet Row Pre-Admission Testing from 2025 in [facility]   Smoking status 1 filed at 2025   History of motion sickness or PONV  1 filed at 2025   Use of postoperative opioids 1 filed at 2025   Gender - Female 1=Yes filed at 2025   Apfel Simplified Score Calculator 4 filed at 2025          Risk Analysis Index Results This Encounter         2025             Do you live in a place other than your own home?: 0    When did you begin living in the place you are currently residing?: Greater than one year ago    Any kidney failure, kidney not working well, or seeing a kidney doctor (nephrologist)? If yes, was this for kidney stones or another problem?: 0 No    Any history of chronic (long-term) congestive heart failure (CHF)?: 0 No    Any shortness of breath when resting?: 0 No    In the past five years, have you been diagnosed with or treated for cancer?: No    During the last 3 months has it become difficult for you to remember things or organize your thoughts?: 0 No    Have you lost weight of 10 pounds or more in the past 3 months without trying?: 0 No    Do you have any loss of appetitie?: 0 No    Getting Around (Mobility): 0 Can get around without help    Eatin Can plan and prepare own meals    Toiletin Can use toilet without any help    Personal Hygiene (Bathing, Hand Washing, Changing Clothes): 0 Can shower or bathe without any help    MEIER Cancer History: Patient does not indicate history of cancer    Total Risk Analysis Index Score Without Cancer: 16    Total Risk Analysis Index Score: 16          Stop Bang Score      Flowsheet Row Pre-Admission Testing  from 6/18/2025 in [facility]   Do you snore loudly? 1 filed at 06/18/2025 0805   Do you often feel tired or fatigued after your sleep? 0 filed at 06/18/2025 0805   Has anyone ever observed you stop breathing in your sleep? 0 filed at 06/18/2025 0805   Do you have or are you being treated for high blood pressure? 0 filed at 06/18/2025 0805   Recent BMI (Calculated) 40.7 filed at 06/18/2025 0805   Is BMI greater than 35 kg/m2? 1=Yes filed at 06/18/2025 0805   Age older than 50 years old? 1=Yes filed at 06/18/2025 0805   Is your neck circumference greater than 17 inches (Male) or 16 inches (Female)? 1 filed at 06/18/2025 0805   Gender - Male 0=No filed at 06/18/2025 0805   STOP-BANG Total Score 4 filed at 06/18/2025 0805          Prodigy: High Risk  Total Score: 3              Prodigy Previous Opioid Use Score           ARISCAT Score for Postoperative Pulmonary Complications      Flowsheet Row Pre-Admission Testing from 6/18/2025 in [facility]   Age Calculated Score 3 filed at 06/18/2025 0828   Preoperative SpO2 0 filed at 06/18/2025 0828   Respiratory infection in the last month Either upper or lower (i.e., URI, bronchitis, pneumonia), with fever and antibiotic treatment 0 filed at 06/18/2025 0828   Preoperative anemia (Hgb less than 10 g/dl) 0 filed at 06/18/2025 0828   Surgical incision  15 filed at 06/18/2025 0828   Duration of surgery  0 filed at 06/18/2025 0828   Emergency Procedure  0 filed at 06/18/2025 0828   ARISCAT Total Score  18 filed at 06/18/2025 0828          Berman Perioperative Risk for Myocardial Infarction or Cardiac Arrest (MORAIMA)      Flowsheet Row Pre-Admission Testing from 6/18/2025 in [facility]   Calculated Age Score 1.14 filed at 06/18/2025 0828   Functional Status  0 filed at 06/18/2025 0828   ASA Class  -3.29 filed at 06/18/2025 0828   Creatinine 0 filed at 06/18/2025 0828   Type of Procedure  -0.25 filed at 06/18/2025 0828   MORAIMA Total Score  -7.65  filed at 06/18/2025 0828   MORAIMA % 0.05 filed at 06/18/2025 0828            Assessment and Plan:     Chief Complaint: Patient is a 59 y.o. female scheduled for gastric sleeve on 7/1/25 with Dr. Braden secondary to morbid obesity. Pt has a past medical history that includes obesity, abnormal glucose without DM, hyperlipidemia, OA, hypothyroid (now euthyroid without meds). Patient presents today to [facility] for perioperative risk stratification and optimization.          Neuro:  No neurologic diagnosis, however, the patient is at increased risk for perioperative delirium secondary to  type and duration of surgery, Patient instructed on and provided cognitive exercises  Patient is at increased risk for perioperative CVA secondary to  Hx Hyperlipidemia    HEENT:  No HEENT diagnosis or significant findings on chart review or clinical presentation and evaluation. No further preoperative testing/intervention indicated at this time.    Cardiovascular:  No CV diagnosis or significant findings on chart review or clinical presentation and evaluation. No further preoperative testing or intervention is indicated at this time.  METS: 9.9  RCRI: 1 point, 6.0% risk for postoperative MACE   MORAIMA: 0.05% risk for 30 day postoperative MACE  EKG - performed 4/1/25.  NSR            Pulmonary:  No pulmonary diagnosis, however patient is at increased risk of perioperative complications secondary to  obesity  Stop Bang score is 4 placing patient at high risk for SILVA  ARISCAT: <26 points, 1.6% risk of in-hospital postoperative pulmonary complication  PRODIGY: Moderate risk for opioid induced respiratory depression  Pumonary toilet education discussed, patient also provided deep breathing exercises and incentive spirometry educational handout    Renal:   No renal diagnosis, however patient is at increase risk for perioperative renal complications secondary to  Age equal to or greater than 56, BMI equal to or greater than  30      Endocrine:  No endocrine diagnosis or significant findings on chart review or clinical presentation and evaluation. No further testing or intervention is indicated at this time.    Hematologic:  No hematologic diagnosis, however patient is at an increased risk for DVT  Caprini Score 7, patient at High risk for perioperative DVT.  Patient provided with VTE education/handout.    Antiplatelet management   The patient is not currently receiving antiplatelet therapy.  Anticoagulation management  The patient is not currently receiving anticoagulation therapy. Patient provided with DVT educational handout.      Gastrointestinal:   No GI diagnosis or significant findings on chart review or clinical presentation and evaluation.   Eat-10 score 0  Apfel 4    Infectious disease:   No infectious diagnosis or significant findings on chart review or clinical presentation and evaluation.       Musculoskeletal:   No diagnosis or significant findings on chart review or clinical presentation and evaluation.     Anesthesia/Airway:  No anesthesia complications      Medication instructions and NPO guidelines reviewed with the patient.  All questions or concerns discussed and addressed.         EKG reviewed in clinic.   Blood work obtained in clinic.   Urine obtained in clinic.   CXR ordered and pt escorted to radiology to complete this after visit in office.     Betzaida Hurtado, APRN-CNP

## 2025-06-19 LAB — BACTERIA UR CULT: NORMAL

## 2025-06-22 LAB
ANABASINE UR-MCNC: <5 NG/ML
COTININE UR-MCNC: <15 NG/ML
NICOTINE UR-MCNC: <15 NG/ML
TRANS-3-OH-COTININE UR-MCNC: <50 NG/ML

## 2025-06-23 ENCOUNTER — ANESTHESIA EVENT (OUTPATIENT)
Dept: OPERATING ROOM | Facility: HOSPITAL | Age: 60
End: 2025-06-23
Payer: COMMERCIAL

## 2025-06-27 NOTE — PROGRESS NOTES
"Follow Up Bariatric Nutrition Assessment    Name: Lupe Schmidt  MRN: [id number]  Date: 07/08/25    Surgery Date: 7/01/25  Surgeon: Asiya  Procedure: sleeve gastrectomy    ASSESSMENT:  Current weight in pounds:    Vitals:    07/08/25 0834   Weight: 94.3 kg (208 lb)    Ht:   1.6 m (5' 3\")     BMI: Body mass index is 36.85 kg/m².  Previous weight in pounds: 226.9  6/18/25  Initial start weight in pounds: 237.0  1/31/25  EBW in pounds: 96.0  Total weight change in pounds: 29.0  %EBW Lost: 30.2    PROGRESS:  Nutrition Interventions for last encounter:  Drink 64 oz of fluid daily  Drink enough protein shakes to meet your goal of 60-70 g of protein per day  Take 2 chewable multivitamins, 6311-3032 mg of calcium citrate, and 500 mcg of B12 daily  Get up and walk frequently throughout the day.     CHANGES IN TREATMENT:   Patient met goals: yes    24 hour food recall: 64 oz. fluid and 90 grams protein  Beverages: water, OWYN, Fairlife Core, Propel  Alcohol: no    Vitamins:  Bariatric Pal MVI, 1500 mg Calcium (Bariatric Pal), and B12  Medications: Current Medications[1]    Physical Activity: Trying to walk a few minutes each hour.  Injured back and activity is limited.    READINESS TO LEARN:  Motivation to learn:         Interested     Understanding of instruction: Good  Anticipated Compliance:  Good  Family Support: Unable to assess-family not present     The pt is 1 week post op sleeve gastrectomy.    Patient has lost 29.0 pounds since initial assessment accounting for 30.2% loss excess body weight.  Patient is tolerating full liquid diet.  Protein intake is adequate for post-op individual. Fluid consumption is adequate. Patient is supplementing recommended vitamin/minerals.  Activity is limited since she injured her back, but trying to walk a few minutes each hour.  Reviewed the puree and soft foods.   Reminded pt. to eat slowly, chew thoroughly and to try one new food at a time.    Malnutrition Screening  Significant " unintentional weight loss? n/a  Eating less than 75% of usual intake for more than 2 weeks? n/a      Nutrition Diagnosis:   Increased protein and nutrition needs related to altered GI function as evidenced by pt. s/p sleeve gastrectomy.  Food- and nutrition-related knowledge deficit related to lack of prior exposure to surgical weight loss information as evidenced by diet recall.     Nutrition Interventions:   Modify type and amount of food and nutrients within meals and snacks.  Comprehensive Nutrition Education  -Nutrition education materials: none     Recommendations:    Continue to drink your protein shakes to meet your goal of 60-70 g of protein per day. Begin measuring how much protein you can eat on the soft diet so that you know when to start weaning off of your protein shakes.   Continue to drink 64 oz. of zero calorie beverages per day  Continue no drinking 30 min before, during the meal and for 30 minutes after the meal  Continue to exercise   Advance to the puree diet for 2 weeks, then soft food for 3 weeks  Remember to eat slowly and chew thoroughly  Try one new food at a time to test for any intolerances.   Continue to take all of your vitamins and minerals.   Attend support groups as needed.    Nutrition Monitoring and Evaluation:   1-2 pounds weight loss per week  Criteria: weight check, food recall  Need for Follow-up: 6 weeks post op          [1]   Current Outpatient Medications:     acetaminophen (Tylenol) 325 mg tablet, Take 2 tablets (650 mg) by mouth every 6 hours., Disp: , Rfl:     methocarbamol (Robaxin) 500 mg tablet, Take 1 tablet (500 mg) by mouth every 6 hours if needed for muscle spasms for up to 5 days., Disp: 20 tablet, Rfl: 0    multivitamin tablet, Take 1 tablet by mouth once daily., Disp: , Rfl:     NON FORMULARY, Insert 1 each into the vagina once daily. DHEA, ESTRADIOL, PROGESTERONE CREAM, Disp: , Rfl:     [Paused] NON FORMULARY, Inject 1 each under the skin 1 (one) time per week.  Pt states she takes a GLP-1 compound from [facility] once weekly on Saturdays. Current dose is 18 units    Last dose 6/12/25, Disp: , Rfl:     omeprazole (PriLOSEC) 40 mg DR capsule, Take 1 capsule (40 mg) by mouth once daily in the morning. Take before meals. Do not crush or chew. Open capsule, sprinkle beads on SF jello, pudding or applesauce., Disp: 30 capsule, Rfl: 5    ondansetron ODT (Zofran-ODT) 4 mg disintegrating tablet, Dissolve 1 tablet (4 mg) in the mouth every 6 hours if needed for nausea or vomiting., Disp: 15 tablet, Rfl: 1    oxyCODONE (Roxicodone) 5 mg immediate release tablet, Take 1 tablet (5 mg) by mouth every 6 hours if needed for severe pain (7 - 10) or moderate pain (4 - 6) for up to 6 days., Disp: 24 tablet, Rfl: 0    oxyCODONE (Roxicodone) 5 mg immediate release tablet, Take 1 tablet (5 mg) by mouth every 6 hours if needed for moderate pain (4 - 6) or severe pain (7 - 10) for up to 6 days., Disp: 24 tablet, Rfl: 0    [Paused] progesterone (Prometrium) 200 mg capsule, Take 1 capsule (200 mg) by mouth once daily., Disp: , Rfl:     simethicone (Mylicon) 80 mg chewable tablet, Chew and swallow 1 tablet (80 mg) every 4 hours if needed for flatulence (gas pain)., Disp: , Rfl:

## 2025-07-01 ENCOUNTER — HOSPITAL ENCOUNTER (INPATIENT)
Facility: HOSPITAL | Age: 60
LOS: 1 days | Discharge: HOME | End: 2025-07-02
Attending: SURGERY | Admitting: SURGERY
Payer: COMMERCIAL

## 2025-07-01 ENCOUNTER — ANESTHESIA (OUTPATIENT)
Dept: OPERATING ROOM | Facility: HOSPITAL | Age: 60
End: 2025-07-01
Payer: COMMERCIAL

## 2025-07-01 DIAGNOSIS — E66.01 MORBID OBESITY DUE TO EXCESS CALORIES (MULTI): Primary | ICD-10-CM

## 2025-07-01 DIAGNOSIS — E66.01 MORBID OBESITY (MULTI): ICD-10-CM

## 2025-07-01 DIAGNOSIS — E78.49 OTHER HYPERLIPIDEMIA: ICD-10-CM

## 2025-07-01 DIAGNOSIS — Z98.84 S/P LAPAROSCOPIC SLEEVE GASTRECTOMY: ICD-10-CM

## 2025-07-01 LAB
ABO GROUP (TYPE) IN BLOOD: NORMAL
ANTIBODY SCREEN: NORMAL
POCT INTERNATIONAL NORMALIZATION RATIO: 1.2
RH FACTOR (ANTIGEN D): NORMAL

## 2025-07-01 PROCEDURE — 2500000004 HC RX 250 GENERAL PHARMACY W/ HCPCS (ALT 636 FOR OP/ED): Performed by: ANESTHESIOLOGY

## 2025-07-01 PROCEDURE — 0DB64Z3 EXCISION OF STOMACH, PERCUTANEOUS ENDOSCOPIC APPROACH, VERTICAL: ICD-10-PCS | Performed by: SURGERY

## 2025-07-01 PROCEDURE — 36415 COLL VENOUS BLD VENIPUNCTURE: CPT | Performed by: SURGERY

## 2025-07-01 PROCEDURE — 2500000005 HC RX 250 GENERAL PHARMACY W/O HCPCS: Performed by: NURSE ANESTHETIST, CERTIFIED REGISTERED

## 2025-07-01 PROCEDURE — 1200000002 HC GENERAL ROOM WITH TELEMETRY DAILY

## 2025-07-01 PROCEDURE — 3600000009 HC OR TIME - EACH INCREMENTAL 1 MINUTE - PROCEDURE LEVEL FOUR: Performed by: SURGERY

## 2025-07-01 PROCEDURE — 3600000004 HC OR TIME - INITIAL BASE CHARGE - PROCEDURE LEVEL FOUR: Performed by: SURGERY

## 2025-07-01 PROCEDURE — 7100000002 HC RECOVERY ROOM TIME - EACH INCREMENTAL 1 MINUTE: Performed by: SURGERY

## 2025-07-01 PROCEDURE — 2500000004 HC RX 250 GENERAL PHARMACY W/ HCPCS (ALT 636 FOR OP/ED): Performed by: NURSE ANESTHETIST, CERTIFIED REGISTERED

## 2025-07-01 PROCEDURE — 2500000005 HC RX 250 GENERAL PHARMACY W/O HCPCS: Performed by: SURGERY

## 2025-07-01 PROCEDURE — 96372 THER/PROPH/DIAG INJ SC/IM: CPT | Performed by: SURGERY

## 2025-07-01 PROCEDURE — 2780000003 HC OR 278 NO HCPCS: Performed by: SURGERY

## 2025-07-01 PROCEDURE — 43775 LAP SLEEVE GASTRECTOMY: CPT | Performed by: SURGERY

## 2025-07-01 PROCEDURE — 2720000007 HC OR 272 NO HCPCS: Performed by: SURGERY

## 2025-07-01 PROCEDURE — 7100000001 HC RECOVERY ROOM TIME - INITIAL BASE CHARGE: Performed by: SURGERY

## 2025-07-01 PROCEDURE — 94760 N-INVAS EAR/PLS OXIMETRY 1: CPT

## 2025-07-01 PROCEDURE — 2500000001 HC RX 250 WO HCPCS SELF ADMINISTERED DRUGS (ALT 637 FOR MEDICARE OP): Performed by: SURGERY

## 2025-07-01 PROCEDURE — 2500000004 HC RX 250 GENERAL PHARMACY W/ HCPCS (ALT 636 FOR OP/ED): Performed by: SURGERY

## 2025-07-01 PROCEDURE — 3700000002 HC GENERAL ANESTHESIA TIME - EACH INCREMENTAL 1 MINUTE: Performed by: SURGERY

## 2025-07-01 PROCEDURE — 3700000001 HC GENERAL ANESTHESIA TIME - INITIAL BASE CHARGE: Performed by: SURGERY

## 2025-07-01 PROCEDURE — A43775 PR LAP, GAST RESTRICT PROC, LONGITUDINAL GASTRECTOMY: Performed by: NURSE ANESTHETIST, CERTIFIED REGISTERED

## 2025-07-01 PROCEDURE — 0DJ08ZZ INSPECTION OF UPPER INTESTINAL TRACT, VIA NATURAL OR ARTIFICIAL OPENING ENDOSCOPIC: ICD-10-PCS | Performed by: SURGERY

## 2025-07-01 PROCEDURE — 2500000002 HC RX 250 W HCPCS SELF ADMINISTERED DRUGS (ALT 637 FOR MEDICARE OP, ALT 636 FOR OP/ED): Performed by: SURGERY

## 2025-07-01 PROCEDURE — 86901 BLOOD TYPING SEROLOGIC RH(D): CPT | Performed by: SURGERY

## 2025-07-01 PROCEDURE — A43775 PR LAP, GAST RESTRICT PROC, LONGITUDINAL GASTRECTOMY: Performed by: ANESTHESIOLOGY

## 2025-07-01 RX ORDER — HYDRALAZINE HYDROCHLORIDE 20 MG/ML
10 INJECTION INTRAMUSCULAR; INTRAVENOUS EVERY 4 HOURS PRN
Status: DISCONTINUED | OUTPATIENT
Start: 2025-07-01 | End: 2025-07-02 | Stop reason: HOSPADM

## 2025-07-01 RX ORDER — PROCHLORPERAZINE MALEATE 5 MG
10 TABLET ORAL EVERY 6 HOURS PRN
Status: DISCONTINUED | OUTPATIENT
Start: 2025-07-01 | End: 2025-07-02 | Stop reason: HOSPADM

## 2025-07-01 RX ORDER — SODIUM CHLORIDE AND POTASSIUM CHLORIDE 150; 900 MG/100ML; MG/100ML
75 INJECTION, SOLUTION INTRAVENOUS CONTINUOUS
Status: DISCONTINUED | OUTPATIENT
Start: 2025-07-01 | End: 2025-07-02 | Stop reason: HOSPADM

## 2025-07-01 RX ORDER — ALBUTEROL SULFATE 0.83 MG/ML
2.5 SOLUTION RESPIRATORY (INHALATION) ONCE AS NEEDED
Status: DISCONTINUED | OUTPATIENT
Start: 2025-07-01 | End: 2025-07-01 | Stop reason: HOSPADM

## 2025-07-01 RX ORDER — KETOROLAC TROMETHAMINE 30 MG/ML
INJECTION, SOLUTION INTRAMUSCULAR; INTRAVENOUS AS NEEDED
Status: DISCONTINUED | OUTPATIENT
Start: 2025-07-01 | End: 2025-07-01

## 2025-07-01 RX ORDER — ACETAMINOPHEN 325 MG/1
650 TABLET ORAL ONCE
Status: COMPLETED | OUTPATIENT
Start: 2025-07-01 | End: 2025-07-01

## 2025-07-01 RX ORDER — DROPERIDOL 2.5 MG/ML
0.62 INJECTION, SOLUTION INTRAMUSCULAR; INTRAVENOUS ONCE AS NEEDED
Status: COMPLETED | OUTPATIENT
Start: 2025-07-01 | End: 2025-07-01

## 2025-07-01 RX ORDER — SODIUM CHLORIDE, SODIUM LACTATE, POTASSIUM CHLORIDE, CALCIUM CHLORIDE 600; 310; 30; 20 MG/100ML; MG/100ML; MG/100ML; MG/100ML
20 INJECTION, SOLUTION INTRAVENOUS CONTINUOUS
Status: ACTIVE | OUTPATIENT
Start: 2025-07-01 | End: 2025-07-02

## 2025-07-01 RX ORDER — PROPOFOL 10 MG/ML
INJECTION, EMULSION INTRAVENOUS AS NEEDED
Status: DISCONTINUED | OUTPATIENT
Start: 2025-07-01 | End: 2025-07-01

## 2025-07-01 RX ORDER — CELECOXIB 400 MG/1
400 CAPSULE ORAL ONCE
Status: COMPLETED | OUTPATIENT
Start: 2025-07-01 | End: 2025-07-01

## 2025-07-01 RX ORDER — ONDANSETRON HYDROCHLORIDE 2 MG/ML
4 INJECTION, SOLUTION INTRAVENOUS ONCE AS NEEDED
Status: COMPLETED | OUTPATIENT
Start: 2025-07-01 | End: 2025-07-01

## 2025-07-01 RX ORDER — OXYCODONE HYDROCHLORIDE 10 MG/1
10 TABLET ORAL EVERY 4 HOURS PRN
Status: DISCONTINUED | OUTPATIENT
Start: 2025-07-01 | End: 2025-07-02 | Stop reason: HOSPADM

## 2025-07-01 RX ORDER — ENOXAPARIN SODIUM 100 MG/ML
40 INJECTION SUBCUTANEOUS EVERY 12 HOURS
Status: DISCONTINUED | OUTPATIENT
Start: 2025-07-01 | End: 2025-07-02 | Stop reason: HOSPADM

## 2025-07-01 RX ORDER — PROCHLORPERAZINE 25 MG/1
25 SUPPOSITORY RECTAL EVERY 12 HOURS PRN
Status: DISCONTINUED | OUTPATIENT
Start: 2025-07-01 | End: 2025-07-02 | Stop reason: HOSPADM

## 2025-07-01 RX ORDER — PANTOPRAZOLE SODIUM 40 MG/1
40 TABLET, DELAYED RELEASE ORAL
Status: DISCONTINUED | OUTPATIENT
Start: 2025-07-02 | End: 2025-07-02 | Stop reason: HOSPADM

## 2025-07-01 RX ORDER — ONDANSETRON HYDROCHLORIDE 2 MG/ML
4 INJECTION, SOLUTION INTRAVENOUS EVERY 6 HOURS
Status: DISCONTINUED | OUTPATIENT
Start: 2025-07-01 | End: 2025-07-02 | Stop reason: HOSPADM

## 2025-07-01 RX ORDER — APREPITANT 40 MG/1
40 CAPSULE ORAL ONCE
Status: COMPLETED | OUTPATIENT
Start: 2025-07-01 | End: 2025-07-01

## 2025-07-01 RX ORDER — GABAPENTIN 300 MG/1
600 CAPSULE ORAL ONCE
Status: COMPLETED | OUTPATIENT
Start: 2025-07-01 | End: 2025-07-01

## 2025-07-01 RX ORDER — MEPERIDINE HYDROCHLORIDE 25 MG/ML
12.5 INJECTION INTRAMUSCULAR; INTRAVENOUS; SUBCUTANEOUS EVERY 10 MIN PRN
Status: DISCONTINUED | OUTPATIENT
Start: 2025-07-01 | End: 2025-07-01 | Stop reason: HOSPADM

## 2025-07-01 RX ORDER — SIMETHICONE 80 MG
80 TABLET,CHEWABLE ORAL EVERY 4 HOURS PRN
Status: DISCONTINUED | OUTPATIENT
Start: 2025-07-01 | End: 2025-07-02 | Stop reason: HOSPADM

## 2025-07-01 RX ORDER — PANTOPRAZOLE SODIUM 40 MG/10ML
40 INJECTION, POWDER, LYOPHILIZED, FOR SOLUTION INTRAVENOUS
Status: DISCONTINUED | OUTPATIENT
Start: 2025-07-02 | End: 2025-07-02

## 2025-07-01 RX ORDER — ONDANSETRON HYDROCHLORIDE 2 MG/ML
INJECTION, SOLUTION INTRAVENOUS AS NEEDED
Status: DISCONTINUED | OUTPATIENT
Start: 2025-07-01 | End: 2025-07-01

## 2025-07-01 RX ORDER — LIDOCAINE HCL/PF 100 MG/5ML
SYRINGE (ML) INTRAVENOUS AS NEEDED
Status: DISCONTINUED | OUTPATIENT
Start: 2025-07-01 | End: 2025-07-01

## 2025-07-01 RX ORDER — FENTANYL CITRATE 50 UG/ML
INJECTION, SOLUTION INTRAMUSCULAR; INTRAVENOUS AS NEEDED
Status: DISCONTINUED | OUTPATIENT
Start: 2025-07-01 | End: 2025-07-01

## 2025-07-01 RX ORDER — ONDANSETRON 4 MG/1
4 TABLET, ORALLY DISINTEGRATING ORAL EVERY 6 HOURS
Status: DISCONTINUED | OUTPATIENT
Start: 2025-07-01 | End: 2025-07-02 | Stop reason: HOSPADM

## 2025-07-01 RX ORDER — ROCURONIUM BROMIDE 10 MG/ML
INJECTION, SOLUTION INTRAVENOUS AS NEEDED
Status: DISCONTINUED | OUTPATIENT
Start: 2025-07-01 | End: 2025-07-01

## 2025-07-01 RX ORDER — OXYCODONE HYDROCHLORIDE 5 MG/1
5 TABLET ORAL EVERY 4 HOURS PRN
Status: DISCONTINUED | OUTPATIENT
Start: 2025-07-01 | End: 2025-07-01 | Stop reason: HOSPADM

## 2025-07-01 RX ORDER — HEPARIN SODIUM 5000 [USP'U]/ML
5000 INJECTION, SOLUTION INTRAVENOUS; SUBCUTANEOUS ONCE
Status: COMPLETED | OUTPATIENT
Start: 2025-07-01 | End: 2025-07-01

## 2025-07-01 RX ORDER — ACETAMINOPHEN 325 MG/1
650 TABLET ORAL EVERY 6 HOURS
Status: DISCONTINUED | OUTPATIENT
Start: 2025-07-01 | End: 2025-07-02 | Stop reason: HOSPADM

## 2025-07-01 RX ORDER — NALOXONE HYDROCHLORIDE 0.4 MG/ML
0.2 INJECTION, SOLUTION INTRAMUSCULAR; INTRAVENOUS; SUBCUTANEOUS EVERY 5 MIN PRN
Status: DISCONTINUED | OUTPATIENT
Start: 2025-07-01 | End: 2025-07-02 | Stop reason: HOSPADM

## 2025-07-01 RX ORDER — NORETHINDRONE AND ETHINYL ESTRADIOL 0.5-0.035
KIT ORAL AS NEEDED
Status: DISCONTINUED | OUTPATIENT
Start: 2025-07-01 | End: 2025-07-01

## 2025-07-01 RX ORDER — SCOPOLAMINE 1 MG/3D
1 PATCH, EXTENDED RELEASE TRANSDERMAL ONCE
Status: DISCONTINUED | OUTPATIENT
Start: 2025-07-01 | End: 2025-07-02 | Stop reason: HOSPADM

## 2025-07-01 RX ORDER — CEFAZOLIN SODIUM 2 G/100ML
2 INJECTION, SOLUTION INTRAVENOUS ONCE
Status: DISCONTINUED | OUTPATIENT
Start: 2025-07-01 | End: 2025-07-01 | Stop reason: HOSPADM

## 2025-07-01 RX ORDER — PROCHLORPERAZINE EDISYLATE 5 MG/ML
10 INJECTION INTRAMUSCULAR; INTRAVENOUS EVERY 6 HOURS PRN
Status: DISCONTINUED | OUTPATIENT
Start: 2025-07-01 | End: 2025-07-02 | Stop reason: HOSPADM

## 2025-07-01 RX ORDER — MIDAZOLAM HYDROCHLORIDE 1 MG/ML
INJECTION, SOLUTION INTRAMUSCULAR; INTRAVENOUS AS NEEDED
Status: DISCONTINUED | OUTPATIENT
Start: 2025-07-01 | End: 2025-07-01

## 2025-07-01 RX ORDER — OXYCODONE HYDROCHLORIDE 5 MG/1
5 TABLET ORAL EVERY 4 HOURS PRN
Status: DISCONTINUED | OUTPATIENT
Start: 2025-07-01 | End: 2025-07-02 | Stop reason: HOSPADM

## 2025-07-01 RX ORDER — DIPHENHYDRAMINE HYDROCHLORIDE 50 MG/ML
12.5 INJECTION, SOLUTION INTRAMUSCULAR; INTRAVENOUS ONCE AS NEEDED
Status: DISCONTINUED | OUTPATIENT
Start: 2025-07-01 | End: 2025-07-01 | Stop reason: HOSPADM

## 2025-07-01 RX ORDER — CEFAZOLIN 1 G/1
INJECTION, POWDER, FOR SOLUTION INTRAVENOUS AS NEEDED
Status: DISCONTINUED | OUTPATIENT
Start: 2025-07-01 | End: 2025-07-01

## 2025-07-01 RX ORDER — ESOMEPRAZOLE MAGNESIUM 40 MG/1
40 GRANULE, DELAYED RELEASE ORAL
Status: DISCONTINUED | OUTPATIENT
Start: 2025-07-02 | End: 2025-07-02 | Stop reason: HOSPADM

## 2025-07-01 RX ORDER — KETOROLAC TROMETHAMINE 15 MG/ML
15 INJECTION, SOLUTION INTRAMUSCULAR; INTRAVENOUS EVERY 6 HOURS SCHEDULED
Status: DISCONTINUED | OUTPATIENT
Start: 2025-07-01 | End: 2025-07-02 | Stop reason: HOSPADM

## 2025-07-01 RX ORDER — PHENYLEPHRINE HCL IN 0.9% NACL 0.4MG/10ML
SYRINGE (ML) INTRAVENOUS AS NEEDED
Status: DISCONTINUED | OUTPATIENT
Start: 2025-07-01 | End: 2025-07-01

## 2025-07-01 RX ORDER — SODIUM CHLORIDE, SODIUM LACTATE, POTASSIUM CHLORIDE, CALCIUM CHLORIDE 600; 310; 30; 20 MG/100ML; MG/100ML; MG/100ML; MG/100ML
100 INJECTION, SOLUTION INTRAVENOUS CONTINUOUS
Status: DISCONTINUED | OUTPATIENT
Start: 2025-07-01 | End: 2025-07-01 | Stop reason: HOSPADM

## 2025-07-01 RX ADMIN — HYDROMORPHONE HYDROCHLORIDE 0.6 MG: 2 INJECTION, SOLUTION INTRAMUSCULAR; INTRAVENOUS; SUBCUTANEOUS at 14:25

## 2025-07-01 RX ADMIN — FENTANYL CITRATE 50 MCG: 50 INJECTION, SOLUTION INTRAMUSCULAR; INTRAVENOUS at 13:50

## 2025-07-01 RX ADMIN — Medication 120 MCG: at 14:15

## 2025-07-01 RX ADMIN — APREPITANT 40 MG: 40 CAPSULE ORAL at 11:40

## 2025-07-01 RX ADMIN — FENTANYL CITRATE 50 MCG: 50 INJECTION, SOLUTION INTRAMUSCULAR; INTRAVENOUS at 13:30

## 2025-07-01 RX ADMIN — HEPARIN SODIUM 5000 UNITS: 5000 INJECTION, SOLUTION INTRAVENOUS; SUBCUTANEOUS at 11:41

## 2025-07-01 RX ADMIN — KETOROLAC TROMETHAMINE 15 MG: 30 INJECTION, SOLUTION INTRAMUSCULAR at 14:52

## 2025-07-01 RX ADMIN — ROCURONIUM BROMIDE 20 MG: 10 INJECTION, SOLUTION INTRAVENOUS at 14:05

## 2025-07-01 RX ADMIN — ACETAMINOPHEN 650 MG: 325 TABLET, FILM COATED ORAL at 23:38

## 2025-07-01 RX ADMIN — DROPERIDOL 0.62 MG: 2.5 INJECTION, SOLUTION INTRAMUSCULAR; INTRAVENOUS at 16:00

## 2025-07-01 RX ADMIN — EPHEDRINE SULFATE 10 MG: 50 INJECTION, SOLUTION INTRAVENOUS at 14:17

## 2025-07-01 RX ADMIN — ONDANSETRON 4 MG: 2 INJECTION, SOLUTION INTRAMUSCULAR; INTRAVENOUS at 14:42

## 2025-07-01 RX ADMIN — ACETAMINOPHEN 650 MG: 325 TABLET, FILM COATED ORAL at 11:40

## 2025-07-01 RX ADMIN — Medication 120 MCG: at 14:08

## 2025-07-01 RX ADMIN — ENOXAPARIN SODIUM 40 MG: 100 INJECTION SUBCUTANEOUS at 23:38

## 2025-07-01 RX ADMIN — SUGAMMADEX 200 MG: 100 INJECTION, SOLUTION INTRAVENOUS at 14:48

## 2025-07-01 RX ADMIN — ONDANSETRON 4 MG: 2 INJECTION INTRAMUSCULAR; INTRAVENOUS at 21:52

## 2025-07-01 RX ADMIN — ROCURONIUM BROMIDE 50 MG: 10 INJECTION, SOLUTION INTRAVENOUS at 13:30

## 2025-07-01 RX ADMIN — HYDROMORPHONE HYDROCHLORIDE 0.4 MG: 2 INJECTION, SOLUTION INTRAMUSCULAR; INTRAVENOUS; SUBCUTANEOUS at 14:53

## 2025-07-01 RX ADMIN — KETOROLAC TROMETHAMINE 15 MG: 15 INJECTION, SOLUTION INTRAMUSCULAR; INTRAVENOUS at 21:52

## 2025-07-01 RX ADMIN — ONDANSETRON 4 MG: 2 INJECTION INTRAMUSCULAR; INTRAVENOUS at 15:40

## 2025-07-01 RX ADMIN — CEFAZOLIN 2 G: 1 INJECTION, POWDER, FOR SOLUTION INTRAMUSCULAR; INTRAVENOUS at 13:30

## 2025-07-01 RX ADMIN — SODIUM CHLORIDE, POTASSIUM CHLORIDE, SODIUM LACTATE AND CALCIUM CHLORIDE 20 ML/HR: 600; 310; 30; 20 INJECTION, SOLUTION INTRAVENOUS at 12:02

## 2025-07-01 RX ADMIN — SCOPOLAMINE 1 PATCH: 1.5 PATCH, EXTENDED RELEASE TRANSDERMAL at 11:40

## 2025-07-01 RX ADMIN — Medication 160 MCG: at 14:13

## 2025-07-01 RX ADMIN — CELECOXIB 400 MG: 400 CAPSULE ORAL at 11:40

## 2025-07-01 RX ADMIN — GABAPENTIN 600 MG: 300 CAPSULE ORAL at 11:40

## 2025-07-01 RX ADMIN — MIDAZOLAM 2 MG: 1 INJECTION INTRAMUSCULAR; INTRAVENOUS at 13:23

## 2025-07-01 RX ADMIN — SODIUM CHLORIDE AND POTASSIUM CHLORIDE 75 ML/HR: 9; 1.49 INJECTION, SOLUTION INTRAVENOUS at 17:41

## 2025-07-01 RX ADMIN — ACETAMINOPHEN 650 MG: 325 TABLET, FILM COATED ORAL at 17:40

## 2025-07-01 RX ADMIN — PROPOFOL 150 MG: 10 INJECTION, EMULSION INTRAVENOUS at 13:30

## 2025-07-01 RX ADMIN — LIDOCAINE HYDROCHLORIDE 100 MG: 20 INJECTION INTRAVENOUS at 13:30

## 2025-07-01 RX ADMIN — SODIUM CHLORIDE, POTASSIUM CHLORIDE, SODIUM LACTATE AND CALCIUM CHLORIDE: 600; 310; 30; 20 INJECTION, SOLUTION INTRAVENOUS at 13:24

## 2025-07-01 RX ADMIN — Medication 2 L/MIN: at 17:06

## 2025-07-01 RX ADMIN — DEXAMETHASONE SODIUM PHOSPHATE 8 MG: 4 INJECTION INTRA-ARTICULAR; INTRALESIONAL; INTRAMUSCULAR; INTRAVENOUS; SOFT TISSUE at 13:37

## 2025-07-01 SDOH — ECONOMIC STABILITY: FOOD INSECURITY: WITHIN THE PAST 12 MONTHS, THE FOOD YOU BOUGHT JUST DIDN'T LAST AND YOU DIDN'T HAVE MONEY TO GET MORE.: NEVER TRUE

## 2025-07-01 SDOH — SOCIAL STABILITY: SOCIAL INSECURITY: WITHIN THE LAST YEAR, HAVE YOU BEEN HUMILIATED OR EMOTIONALLY ABUSED IN OTHER WAYS BY YOUR PARTNER OR EX-PARTNER?: NO

## 2025-07-01 SDOH — SOCIAL STABILITY: SOCIAL INSECURITY
WITHIN THE LAST YEAR, HAVE YOU BEEN KICKED, HIT, SLAPPED, OR OTHERWISE PHYSICALLY HURT BY YOUR PARTNER OR EX-PARTNER?: NO

## 2025-07-01 SDOH — HEALTH STABILITY: MENTAL HEALTH: CURRENT SMOKER: 0

## 2025-07-01 SDOH — ECONOMIC STABILITY: FOOD INSECURITY: WITHIN THE PAST 12 MONTHS, YOU WORRIED THAT YOUR FOOD WOULD RUN OUT BEFORE YOU GOT THE MONEY TO BUY MORE.: NEVER TRUE

## 2025-07-01 SDOH — ECONOMIC STABILITY: INCOME INSECURITY: IN THE PAST 12 MONTHS HAS THE ELECTRIC, GAS, OIL, OR WATER COMPANY THREATENED TO SHUT OFF SERVICES IN YOUR HOME?: NO

## 2025-07-01 SDOH — SOCIAL STABILITY: SOCIAL INSECURITY
WITHIN THE LAST YEAR, HAVE YOU BEEN RAPED OR FORCED TO HAVE ANY KIND OF SEXUAL ACTIVITY BY YOUR PARTNER OR EX-PARTNER?: NO

## 2025-07-01 SDOH — SOCIAL STABILITY: SOCIAL INSECURITY: HAS ANYONE EVER THREATENED TO HURT YOUR FAMILY OR YOUR PETS?: NO

## 2025-07-01 SDOH — SOCIAL STABILITY: SOCIAL INSECURITY: HAVE YOU HAD THOUGHTS OF HARMING ANYONE ELSE?: NO

## 2025-07-01 SDOH — SOCIAL STABILITY: SOCIAL INSECURITY: ARE THERE ANY APPARENT SIGNS OF INJURIES/BEHAVIORS THAT COULD BE RELATED TO ABUSE/NEGLECT?: NO

## 2025-07-01 SDOH — SOCIAL STABILITY: SOCIAL INSECURITY: DO YOU FEEL UNSAFE GOING BACK TO THE PLACE WHERE YOU ARE LIVING?: NO

## 2025-07-01 SDOH — SOCIAL STABILITY: SOCIAL INSECURITY: WITHIN THE LAST YEAR, HAVE YOU BEEN AFRAID OF YOUR PARTNER OR EX-PARTNER?: NO

## 2025-07-01 SDOH — SOCIAL STABILITY: SOCIAL INSECURITY: HAVE YOU HAD ANY THOUGHTS OF HARMING ANYONE ELSE?: NO

## 2025-07-01 SDOH — SOCIAL STABILITY: SOCIAL INSECURITY: ABUSE: ADULT

## 2025-07-01 SDOH — SOCIAL STABILITY: SOCIAL INSECURITY: DO YOU FEEL ANYONE HAS EXPLOITED OR TAKEN ADVANTAGE OF YOU FINANCIALLY OR OF YOUR PERSONAL PROPERTY?: NO

## 2025-07-01 SDOH — SOCIAL STABILITY: SOCIAL INSECURITY: DOES ANYONE TRY TO KEEP YOU FROM HAVING/CONTACTING OTHER FRIENDS OR DOING THINGS OUTSIDE YOUR HOME?: NO

## 2025-07-01 SDOH — SOCIAL STABILITY: SOCIAL INSECURITY: ARE YOU OR HAVE YOU BEEN THREATENED OR ABUSED PHYSICALLY, EMOTIONALLY, OR SEXUALLY BY ANYONE?: NO

## 2025-07-01 SDOH — SOCIAL STABILITY: SOCIAL INSECURITY: WERE YOU ABLE TO COMPLETE ALL THE BEHAVIORAL HEALTH SCREENINGS?: YES

## 2025-07-01 ASSESSMENT — PAIN SCALES - GENERAL
PAINLEVEL_OUTOF10: 0 - NO PAIN
PAIN_LEVEL: 2
PAINLEVEL_OUTOF10: 0 - NO PAIN
PAINLEVEL_OUTOF10: 2
PAINLEVEL_OUTOF10: 0 - NO PAIN

## 2025-07-01 ASSESSMENT — COGNITIVE AND FUNCTIONAL STATUS - GENERAL
MOBILITY SCORE: 24
MOBILITY SCORE: 24
PATIENT BASELINE BEDBOUND: NO
DAILY ACTIVITIY SCORE: 24
DAILY ACTIVITIY SCORE: 24

## 2025-07-01 ASSESSMENT — PATIENT HEALTH QUESTIONNAIRE - PHQ9
2. FEELING DOWN, DEPRESSED OR HOPELESS: NOT AT ALL
SUM OF ALL RESPONSES TO PHQ9 QUESTIONS 1 & 2: 0
1. LITTLE INTEREST OR PLEASURE IN DOING THINGS: NOT AT ALL

## 2025-07-01 ASSESSMENT — LIFESTYLE VARIABLES
AUDIT-C TOTAL SCORE: 0
HOW MANY STANDARD DRINKS CONTAINING ALCOHOL DO YOU HAVE ON A TYPICAL DAY: PATIENT DOES NOT DRINK
HOW OFTEN DO YOU HAVE A DRINK CONTAINING ALCOHOL: NEVER
SKIP TO QUESTIONS 9-10: 1
HOW OFTEN DO YOU HAVE 6 OR MORE DRINKS ON ONE OCCASION: NEVER
AUDIT-C TOTAL SCORE: 0

## 2025-07-01 ASSESSMENT — ACTIVITIES OF DAILY LIVING (ADL)
WALKS IN HOME: INDEPENDENT
HEARING - LEFT EAR: FUNCTIONAL
FEEDING YOURSELF: INDEPENDENT
JUDGMENT_ADEQUATE_SAFELY_COMPLETE_DAILY_ACTIVITIES: YES
BATHING: INDEPENDENT
ASSISTIVE_DEVICE: EYEGLASSES
ADEQUATE_TO_COMPLETE_ADL: YES
LACK_OF_TRANSPORTATION: NO
HEARING - RIGHT EAR: FUNCTIONAL
PATIENT'S MEMORY ADEQUATE TO SAFELY COMPLETE DAILY ACTIVITIES?: YES
GROOMING: INDEPENDENT
DRESSING YOURSELF: INDEPENDENT
TOILETING: INDEPENDENT

## 2025-07-01 ASSESSMENT — COLUMBIA-SUICIDE SEVERITY RATING SCALE - C-SSRS
2. HAVE YOU ACTUALLY HAD ANY THOUGHTS OF KILLING YOURSELF?: NO
1. IN THE PAST MONTH, HAVE YOU WISHED YOU WERE DEAD OR WISHED YOU COULD GO TO SLEEP AND NOT WAKE UP?: NO
6. HAVE YOU EVER DONE ANYTHING, STARTED TO DO ANYTHING, OR PREPARED TO DO ANYTHING TO END YOUR LIFE?: NO

## 2025-07-01 ASSESSMENT — PAIN - FUNCTIONAL ASSESSMENT
PAIN_FUNCTIONAL_ASSESSMENT: 0-10

## 2025-07-01 NOTE — ANESTHESIA PROCEDURE NOTES
Airway  Date/Time: 7/1/2025 1:34 PM  Reason: elective    Airway not difficult    Staffing  Performed: SRNA   Authorized by: Cristobal Villavicencio MD    Performed by: ARIS Estes-VANESSA  Patient location during procedure: OR    Patient Condition  Indications for airway management: anesthesia  Patient position: sniffing  Sedation level: deep     Final Airway Details   Preoxygenated: yes  Final airway type: endotracheal airway  Successful airway: ETT  Cuffed: yes   Successful intubation technique: video laryngoscopy  Adjuncts used in placement: intubating stylet  Endotracheal tube insertion site: oral  Blade type: Duckworth.  Blade size: #3  ETT size (mm): 7.0  Cormack-Lehane Classification: grade I - full view of glottis  Placement verified by: chest auscultation, capnometry and palpation of cuff   Cuff volume (mL): 10  Measured from: lips  ETT to lips (cm): 20  Number of attempts at approach: 1    Additional Comments  Lips and teeth remain in pre-anesthetic condition s/p intubation.

## 2025-07-01 NOTE — BRIEF OP NOTE
"Date: 2025  OR Location: [facility] OR    Name: Lupe Schmidt \"Calin\", : 1965, Age: 59 y.o., MRN: [id number], Sex: female    Diagnosis  Pre-op Diagnosis      * Morbid obesity (Multi) [E66.01] Post-op Diagnosis     * Morbid obesity (Multi) [E66.01]     Procedures  GASTRECTOMY, SLEEVE, LAPAROSCOPIC  77270 - W[place] LAPS GSTRC RSTRICTIV PX LONGITUDINAL GASTRECTOMY  TAP block  EGD    Surgeons      * José Braden - Primary    Resident/Fellow/Other Assistant:  Surgeons and Role:  * No surgeons found with a matching role *  Zeny Thompson MD    Staff:   Circulator: Ana Lilia  Surgical Assistant: Nick Jeronmio Person: Sweta  Surgical Assistant: Yin    Anesthesia Staff: Anesthesiologist: Cristobal Villavicencio MD  CRNA: ARIS Estes-VANESSA  SRNA: Olga Duncan RN    Procedure Summary  Anesthesia: General  ASA: III  Estimated Blood Loss: 5 mL  Intra-op Medications:   Administrations occurring from 1245 to 1510 on 25:   Medication Name Total Dose   BUPivacaine HCl (Marcaine) 0.5 % (5 mg/mL) 30 mL, bupivacaine PF 0.25 % (Marcaine) 0.25 % (2.5 mg/mL) 30 mL, dexAMETHasone (Decadron) 4 mg in sodium chloride 0.9% 100 mL syringe 161 mL   ceFAZolin (Ancef) vial 1 g 2 g   dexAMETHasone (Decadron) 4 mg/mL IV Syringe 2 mL 8 mg   ePHEDrine injection 10 mg   fentaNYL (Sublimaze) injection 50 mcg/mL 100 mcg   HYDROmorphone (Dilaudid) injection 2 mg/mL 1 mg   ketorolac (Toradol) injection 30 mg 15 mg   lactated Ringer's infusion Cannot be calculated   lidocaine (cardiac) injection 2% prefilled syringe 100 mg   midazolam (Versed) injection 1 mg/mL 2 mg   ondansetron (Zofran) 2 mg/mL injection 4 mg   phenylephrine 40 mcg/mL syringe 10 mL 400 mcg   propofol (Diprivan) injection 10 mg/mL 150 mg   rocuronium (ZeMuron) 50 mg/5 mL injection 70 mg   sugammadex (Bridion) 200 mg/2 mL injection 200 mg              Anesthesia Record               Intraprocedure I/O Totals          Intake    lactated Ringer's infusion 1000.00 mL    Total " Intake 1000 mL       Output    Est. Blood Loss 5 mL    Total Output 5 mL       Net    Net Volume 995 mL          Specimen:   ID Type Source Tests Collected by Time   1 : GREATER CURVATURE OF STOMACH Tissue STOMACH GASTRECTOMY SURGICAL PATHOLOGY EXAM José Braden MD 7/1/2025 1412                  Findings: normal anatomy    Complications:  None; patient tolerated the procedure well.     Disposition: PACU - hemodynamically stable.  Condition: stable  Specimens Collected:   ID Type Source Tests Collected by Time   1 : GREATER CURVATURE OF STOMACH Tissue STOMACH GASTRECTOMY SURGICAL PATHOLOGY EXAM José Braden MD 7/1/2025 1412     Attending Attestation: I was present and scrubbed for the entire procedure.    José Braden  Phone Number: [phone]

## 2025-07-01 NOTE — CARE PLAN
The patient's goals for the shift include      The clinical goals for the shift include Pain control, ambulation, and adequate intake      Problem: Pain - Adult  Goal: Verbalizes/displays adequate comfort level or baseline comfort level  Outcome: Progressing     Problem: Safety - Adult  Goal: Free from fall injury  Outcome: Progressing     Problem: Discharge Planning  Goal: Discharge to home or other facility with appropriate resources  Outcome: Progressing

## 2025-07-01 NOTE — OP NOTE
DATE PERFORMED:  07/01/25     SURGEON:  José Braden MD    ASSISTANT: Zeny Thompson MD     PREOPERATIVE DIAGNOSES:  Morbid obesity, BMI  38.5, hyperlipidemia, obstructive sleep apnea.     POSTOPERATIVE DIAGNOSES:  Morbid obesity, BMI 38.5, hyperlipidemia, obstructive sleep apnea.     PROCEDURES PERFORMED:  1. Laparoscopic sleeve gastrectomy, 2. intraoperative  endoscopy, 3. transversus abdominis plane block.     COMPLICATIONS:  None.     ESTIMATED BLOOD LOSS:  5 ml     DRAINS:  None.     ANESTHESIA:  General endotracheal.     SPECIMENS:  Greater curvature of the stomach.     BRIEF HISTORY:   Lupe Schmidt is a 59 y.o. yo female, morbidly obese with  a BMI of 38.5.   she has obesity-related comorbidities including  hyperlipidemia, obstructive sleep apnea.  she has attempted and failed multiple diet and exercise  regimens in the past.   she was thoroughly evaluated in a  multidisciplinary fashion and found to be an appropriate candidate for laparoscopic sleeve  gastrectomy.   she was informed of the risks and benefits of the  procedure and expressed understanding and consent.     PROCEDURE IN DETAIL:  On the day of the operation, Lupe Schmidt was  properly identified in the holding area.   The patient was taken to the operating  room, placed in a supine position on the operating room table.   An OR huddle was performed. she was  given appropriate perioperative IV antibiotics and heparin subcu.  SCDs  were placed.   Anesthesia was induced and the patient was intubation.  An appropriate OR  time-out was performed.   The patient was prepped and draped in normal sterile  fashion.  A nick was made in the left upper quadrant and a Veress needle  was inserted.  The abdomen was insufflated with CO2 and a 5 mm Optiview  trocar was inserted under direct visualization just to the right and  above the umbilicus.  The upper abdomen was surveilled and no scar  tissue was found.  A bilateral transversus abdominis plane  block was  performed with a mixture of marcaine, decadron, and saline by myself and the assistant.  This  mixture was also used to numb the locations of our trocar placements.  The following trocars were then placed under direct visualization with  the laparoscope by myself and the assistant.  A 5 mm trocar was placed in the left upper quadrant  anterior axillary line.  A 5 mm trocar was placed in the right upper  quadrant anterior axillary line.  A 5 mm trocar was placed in the right  upper quadrant midclavicular line subcostally and finally, a 15 mm trocar  was placed in the right upper quadrant midclavicular line above the  level of the umbilicus.  The camera was controlled by the assistant for the entire case. We then placed the patient in reverse  Trendelenburg position and a snake liver retractor was inserted and  retracted the left lobe of the liver away from the hiatus.  We  began the procedure by grasping the greater curvature of the stomach at  the level of the incisura and created a defect in the gastrocolic  ligament with the Harmonic Scalpel.  We then proceeded to excise the  omentum off the greater curvature of the stomach using the Harmonic  Scalpel, moving proximally until we reached the angle of His. The assistant provided critical retraction during this portion of the procedure. We were  careful to dissect away any posterior attachments both to the floppy  fundus and we used josh on some thin attachments on the posterior body of the stomach. The assistant mobilized the distal portion of the  greater curvature to a level 5 cm proximal to the pylorus.  We then placed a  green load Endo stapler with a SeamGuard taking a small bite from the  greater curvature of the stomach to begin creating our sleeve.  With the help of the assistant,   the stomach was tented up and anesthesia placed a 36-Lebanese Visigi  along the lesser curvature of the stomach.  The bougie was used as a  sizer and we proceeded to  fire a gold load and then several more loads of the blue load Endo  stapler with a SeamGuard moving proximally, hugging the bougie, to create  a tight sleeve.  Our final load was a blue load of the stapler,  completely transecting off the gastric fundus.  The assistant provided necessary retraction during this process. We placed several clips  on the distal staple line for hemostasis.  We then placed a bowel clamp  across the distal stomach and the assistant instilled sterile saline into the upper  quadrant around our new sleeve.  The endoscope was inserted through the  oropharynx into the sleeve.  We were able to  visualize a straight staple line with no bleeding.  Air was instilled  into the stomach and submerged underneath the sterile saline in the  abdominal cavity.  There were no bubbles and thus the leak test was  negative.  The assistant then suctioned the air out of the stomach, removed the  endoscope and suctioned the saline out of the abdominal cavity and  removed the bowel clamp.   We  tacked the distal portion of the sleeve to the hanging gastrocolic  ligament to prevent curling with a 2-0 Strattafix suture.  We removed the gastric remnant through the  15 mm trocar site.  The liver retractor was removed. Satisfied with our hemostasis throughout the  abdomen, the assistant helped to close the 15 mm trocar site with 0 Vicryl  sutures using the Jcarlos-Lula device.  We then desufflated the  abdomen of CO2, removed the camera and the trocars.  The 15 mm trocar  site was copiously irrigated with sterile saline and then all the skin  incisions were closed with 4-0 Monocryl subcuticular sutures and  Dermabond by the assistant.  The patient tolerated the procedure without difficulty, was  extubated immediately postoperatively and was transferred to the PACU in  good condition.    This operation could not have been safely performed (without compromising the technical results or length of the procedure) without  the assistance of a skilled surgical assistant. A surgical assistant was medically necessary for positioning, retraction and instrumentation.      José Braden MD

## 2025-07-01 NOTE — ANESTHESIA POSTPROCEDURE EVALUATION
"Patient: Lupe Schmidt \"Calin\"    Procedure Summary       Date: 07/01/25 Room / Location: [facility] OR 07 / [facility]    Anesthesia Start: 1324 Anesthesia Stop: 1509    Procedure: GASTRECTOMY, SLEEVE, LAPAROSCOPIC Diagnosis:       Morbid obesity (Multi)      (Morbid obesity (Multi) [E66.01])    Surgeons: José Braden MD Responsible Provider: Cristobal Villavicencio MD    Anesthesia Type: general ASA Status: 3            Anesthesia Type: general    Vitals Value Taken Time   BP  07/01/25 15:11   Temp  07/01/25 15:11   Pulse  07/01/25 15:11   Resp  07/01/25 15:11   SpO2  07/01/25 15:11       Anesthesia Post Evaluation    Patient location during evaluation: PACU  Patient participation: complete - patient participated  Level of consciousness: awake  Pain score: 2  Pain management: adequate  Multimodal analgesia pain management approach  Airway patency: patent  Two or more strategies used to mitigate risk of obstructive sleep apnea  Cardiovascular status: acceptable  Respiratory status: acceptable  Hydration status: acceptable  Postoperative Nausea and Vomiting: none        There were no known notable events for this encounter.    "

## 2025-07-02 ENCOUNTER — PHARMACY VISIT (OUTPATIENT)
Dept: PHARMACY | Facility: CLINIC | Age: 60
End: 2025-07-02
Payer: COMMERCIAL

## 2025-07-02 ENCOUNTER — APPOINTMENT (OUTPATIENT)
Dept: RADIOLOGY | Facility: HOSPITAL | Age: 60
End: 2025-07-02
Payer: COMMERCIAL

## 2025-07-02 VITALS
HEIGHT: 63 IN | RESPIRATION RATE: 16 BRPM | DIASTOLIC BLOOD PRESSURE: 80 MMHG | BODY MASS INDEX: 39.27 KG/M2 | HEART RATE: 65 BPM | OXYGEN SATURATION: 94 % | WEIGHT: 221.6 LBS | SYSTOLIC BLOOD PRESSURE: 147 MMHG | TEMPERATURE: 97.5 F

## 2025-07-02 LAB
ALBUMIN SERPL BCP-MCNC: 3.5 G/DL (ref 3.4–5)
ALP SERPL-CCNC: 43 U/L (ref 33–110)
ALT SERPL W P-5'-P-CCNC: 16 U/L (ref 7–45)
ANION GAP SERPL CALC-SCNC: 14 MMOL/L (ref 10–20)
AST SERPL W P-5'-P-CCNC: 18 U/L (ref 9–39)
BASOPHILS # BLD AUTO: 0.01 X10*3/UL (ref 0–0.1)
BASOPHILS NFR BLD AUTO: 0.1 %
BILIRUB SERPL-MCNC: 0.4 MG/DL (ref 0–1.2)
BUN SERPL-MCNC: 13 MG/DL (ref 6–23)
CALCIUM SERPL-MCNC: 8.2 MG/DL (ref 8.6–10.3)
CHLORIDE SERPL-SCNC: 106 MMOL/L (ref 98–107)
CO2 SERPL-SCNC: 22 MMOL/L (ref 21–32)
CREAT SERPL-MCNC: 0.81 MG/DL (ref 0.5–1.05)
EGFRCR SERPLBLD CKD-EPI 2021: 85 ML/MIN/1.73M*2
EOSINOPHIL # BLD AUTO: 0 X10*3/UL (ref 0–0.7)
EOSINOPHIL NFR BLD AUTO: 0 %
ERYTHROCYTE [DISTWIDTH] IN BLOOD BY AUTOMATED COUNT: 12.6 % (ref 11.5–14.5)
GLUCOSE SERPL-MCNC: 144 MG/DL (ref 74–99)
HCT VFR BLD AUTO: 38.5 % (ref 36–46)
HGB BLD-MCNC: 12.8 G/DL (ref 12–16)
IMM GRANULOCYTES # BLD AUTO: 0.04 X10*3/UL (ref 0–0.7)
IMM GRANULOCYTES NFR BLD AUTO: 0.6 % (ref 0–0.9)
LYMPHOCYTES # BLD AUTO: 0.44 X10*3/UL (ref 1.2–4.8)
LYMPHOCYTES NFR BLD AUTO: 6.3 %
MCH RBC QN AUTO: 31.2 PG (ref 26–34)
MCHC RBC AUTO-ENTMCNC: 33.2 G/DL (ref 32–36)
MCV RBC AUTO: 94 FL (ref 80–100)
MONOCYTES # BLD AUTO: 0.1 X10*3/UL (ref 0.1–1)
MONOCYTES NFR BLD AUTO: 1.4 %
NEUTROPHILS # BLD AUTO: 6.43 X10*3/UL (ref 1.2–7.7)
NEUTROPHILS NFR BLD AUTO: 91.6 %
NRBC BLD-RTO: 0 /100 WBCS (ref 0–0)
PLATELET # BLD AUTO: 214 X10*3/UL (ref 150–450)
POTASSIUM SERPL-SCNC: 4.6 MMOL/L (ref 3.5–5.3)
PROT SERPL-MCNC: 6 G/DL (ref 6.4–8.2)
RBC # BLD AUTO: 4.1 X10*6/UL (ref 4–5.2)
SODIUM SERPL-SCNC: 137 MMOL/L (ref 136–145)
WBC # BLD AUTO: 7 X10*3/UL (ref 4.4–11.3)

## 2025-07-02 PROCEDURE — 99024 POSTOP FOLLOW-UP VISIT: CPT | Performed by: SURGERY

## 2025-07-02 PROCEDURE — 74220 X-RAY XM ESOPHAGUS 1CNTRST: CPT

## 2025-07-02 PROCEDURE — 36415 COLL VENOUS BLD VENIPUNCTURE: CPT | Performed by: SURGERY

## 2025-07-02 PROCEDURE — 85025 COMPLETE CBC W/AUTO DIFF WBC: CPT | Performed by: SURGERY

## 2025-07-02 PROCEDURE — 2500000001 HC RX 250 WO HCPCS SELF ADMINISTERED DRUGS (ALT 637 FOR MEDICARE OP): Performed by: SURGERY

## 2025-07-02 PROCEDURE — 74220 X-RAY XM ESOPHAGUS 1CNTRST: CPT | Performed by: STUDENT IN AN ORGANIZED HEALTH CARE EDUCATION/TRAINING PROGRAM

## 2025-07-02 PROCEDURE — 2550000001 HC RX 255 CONTRASTS: Performed by: SURGERY

## 2025-07-02 PROCEDURE — 2500000004 HC RX 250 GENERAL PHARMACY W/ HCPCS (ALT 636 FOR OP/ED): Performed by: SURGERY

## 2025-07-02 PROCEDURE — 80053 COMPREHEN METABOLIC PANEL: CPT | Performed by: SURGERY

## 2025-07-02 RX ORDER — OXYCODONE HYDROCHLORIDE 5 MG/1
5 TABLET ORAL EVERY 6 HOURS PRN
Qty: 24 TABLET | Refills: 0 | Status: SHIPPED | OUTPATIENT
Start: 2025-07-02 | End: 2025-07-08

## 2025-07-02 RX ORDER — ACETAMINOPHEN 325 MG/1
650 TABLET ORAL EVERY 6 HOURS
Start: 2025-07-02

## 2025-07-02 RX ORDER — SIMETHICONE 80 MG
80 TABLET,CHEWABLE ORAL EVERY 4 HOURS PRN
Start: 2025-07-02

## 2025-07-02 RX ORDER — DIATRIZOATE MEGLUMINE AND DIATRIZOATE SODIUM 660; 100 MG/ML; MG/ML
30 SOLUTION ORAL; RECTAL ONCE
Status: COMPLETED | OUTPATIENT
Start: 2025-07-02 | End: 2025-07-02

## 2025-07-02 RX ADMIN — KETOROLAC TROMETHAMINE 15 MG: 15 INJECTION, SOLUTION INTRAMUSCULAR; INTRAVENOUS at 14:55

## 2025-07-02 RX ADMIN — ACETAMINOPHEN 650 MG: 325 TABLET, FILM COATED ORAL at 13:13

## 2025-07-02 RX ADMIN — PANTOPRAZOLE SODIUM 40 MG: 40 INJECTION, POWDER, FOR SOLUTION INTRAVENOUS at 06:35

## 2025-07-02 RX ADMIN — DIATRIZOATE MEGLUMINE AND DIATRIZOATE SODIUM 30 ML: 660; 100 LIQUID ORAL; RECTAL at 09:02

## 2025-07-02 RX ADMIN — KETOROLAC TROMETHAMINE 15 MG: 15 INJECTION, SOLUTION INTRAMUSCULAR; INTRAVENOUS at 03:42

## 2025-07-02 RX ADMIN — ENOXAPARIN SODIUM 40 MG: 100 INJECTION SUBCUTANEOUS at 13:13

## 2025-07-02 RX ADMIN — SIMETHICONE 80 MG: 80 TABLET, CHEWABLE ORAL at 13:14

## 2025-07-02 SDOH — SOCIAL STABILITY: SOCIAL NETWORK: COMMUNITY RESOURCES: OTHER (COMMENT)

## 2025-07-02 ASSESSMENT — COGNITIVE AND FUNCTIONAL STATUS - GENERAL
DAILY ACTIVITIY SCORE: 24
MOBILITY SCORE: 24

## 2025-07-02 ASSESSMENT — PAIN SCALES - GENERAL: PAINLEVEL_OUTOF10: 0 - NO PAIN

## 2025-07-02 ASSESSMENT — ACTIVITIES OF DAILY LIVING (ADL): LACK_OF_TRANSPORTATION: NO

## 2025-07-02 ASSESSMENT — PAIN - FUNCTIONAL ASSESSMENT: PAIN_FUNCTIONAL_ASSESSMENT: 0-10

## 2025-07-02 NOTE — CONSULTS
Nutrition Education Note  Nutrition Assessment      Reason for Assessment: Diet education    History:   Received consult to review post-op Bariatric diet with pt.     Procedure: sleeve gastrectomy on (7/1/25)    Pt has been provided with Full Liquid diet handout s/p surgery. States has vitamin and protein drinks at home. Denies further questions or concerns at this time, aware RD available if needs arise, pt instructed to follow up with out patient bariatric dietitian.     Vitals:    07/02/25 0439   Weight: 101 kg (221 lb 9.6 oz)      Body mass index is 39.25 kg/m².     Food and/or Nutrient Delivery Interventions      Education Documentation  Nutrition  instructions after weight loss surgery, taught by Maria R Bernabe RD at 7/2/2025 10:20 AM.  Learner: Patient  Readiness: Acceptance  Method: Explanation, Handout  Response: Verbalizes Understanding, Needs Reinforcement        Time Spent (min): 30 minutes  Last Date of Nutrition Visit: 07/02/25  Nutrition Follow-Up Needed?: None  Follow up Comment: sachin education

## 2025-07-02 NOTE — CARE PLAN
The patient's goals for the shift include rest     Problem: Pain - Adult  Goal: Verbalizes/displays adequate comfort level or baseline comfort level  Outcome: Progressing     Problem: Safety - Adult  Goal: Free from fall injury  Outcome: Progressing     Problem: Discharge Planning  Goal: Discharge to home or other facility with appropriate resources  Outcome: Progressing     Problem: Chronic Conditions and Co-morbidities  Goal: Patient's chronic conditions and co-morbidity symptoms are monitored and maintained or improved  Outcome: Progressing     Problem: Nutrition  Goal: Nutrient intake appropriate for maintaining nutritional needs  Outcome: Progressing     The clinical goals for the shift include Pain control, ambulation, and adequate intake

## 2025-07-02 NOTE — DISCHARGE INSTRUCTIONS
Lupe Schmidt  1965    Date of admission: 7/1/25    Date of discharge: 07/02/25    DISPOSITION: home    PRINCIPAL DIAGNOSIS:  Morbid Obesity    OTHER DIAGNOSES:  This SmartSection is not supported in the current .    Primary Care:     Renee Valdez MD  Other Providers:         Procedures while hospitalized:  Laparoscopic sleeve gastrectomy    PENDING RESULTS:  Pathology results from surgery    Wound care: You may shower and wash your abdomen with mild soap and water. Pat incisions to dry.  Do not soak in a bath or pool for at least 2 weeks. Your incisions are closed with dissolvable stitches and skin glue. The glue will peel off on its own after about 1 week.    Activity: You may walk and climb stairs as tolerated. You should not lie or sit down for more than 1 hour at a time except for when sleeping at night. Activity is important to prevent blood clots. No heavy lifting of objects greater than 10 lbs (or a gallon of milk).    No driving or returning to work until you no longer require narcotic pain medications (Roxicodone).    Diet: You will be on a Phase 2 diet for the next 10-14 days. This includes sugar-free, non-carbonated clear liquids, thick liquids (like strained cream soups, sugar-free pudding, and protein shakes). After about 2 weeks or when cleared by your surgeon/dietician, you may begin the Phase 3 pureed diet (as per your Bariatric Guidebook).    Remember to drink at least 64 oz of liquids per day and try to take in 40-60 grams of protein.     Avoid straws and carbonated beverages. Take small sips every couple of minutes. Your water bottle is your best friend and you shouldn't go anywhere without it!     Warning: If you experience severe pain, fever over 101 degrees F, redness or drainage from incisions, nausea/vomiting that lasts more than 12 hours, rapid heart rate or shortness of breath, call your surgeon immediately.       Your medication list        PAUSE taking these  medications        Instructions Last Dose Given Next Dose Due   NON FORMULARY  Wait to take this until your doctor or other care provider tells you to start again.           progesterone 200 mg capsule  Wait to take this until your doctor or other care provider tells you to start again.  Commonly known as: Prometrium                  START taking these medications        Instructions Last Dose Given Next Dose Due   acetaminophen 325 mg tablet  Commonly known as: Tylenol      Take 2 tablets (650 mg) by mouth every 6 hours.       oxyCODONE 5 mg immediate release tablet  Commonly known as: Roxicodone      Take 1 tablet (5 mg) by mouth every 6 hours if needed for moderate pain (4 - 6) or severe pain (7 - 10) for up to 6 days.       simethicone 80 mg chewable tablet  Commonly known as: Mylicon      Chew and swallow 1 tablet (80 mg) every 4 hours if needed for flatulence (gas pain).              CONTINUE taking these medications        Instructions Last Dose Given Next Dose Due   multivitamin tablet           NON FORMULARY           omeprazole 40 mg DR capsule  Commonly known as: PriLOSEC      Take 1 capsule (40 mg) by mouth once daily in the morning. Take before meals. Do not crush or chew. Open capsule, sprinkle beads on SF jello, pudding or applesauce.       ondansetron ODT 4 mg disintegrating tablet  Commonly known as: Zofran-ODT      Dissolve 1 tablet (4 mg) in the mouth every 6 hours if needed for nausea or vomiting.                 Where to Get Your Medications        These medications were sent to [facility]  [id number], [place] [address]      Hours: 9 AM to 5 PM Mon-Fri Phone: [phone]   oxyCODONE 5 mg immediate release tablet       Information about where to get these medications is not yet available    Ask your nurse or doctor about these medications  acetaminophen 325 mg tablet  simethicone 80 mg chewable tablet         You do not need to begin taking your vitamins until instructed by  your surgeon at your first follow-up visit.    Please consult your medical doctor regarding adjusting your medications after surgery (particularly diabetic and blood pressure medications).    Follow up with Dr Braden in her [facility] office on 7/18/25.

## 2025-07-02 NOTE — DISCHARGE INSTR - DIET
Pt provided with handout on nutrition instructions after weight loss surgery. Follow up with bariatric dietitian as outpatient.

## 2025-07-02 NOTE — PROGRESS NOTES
07/02/25 1314   Discharge Planning   Living Arrangements Spouse/significant other;Children   Support Systems Spouse/significant other;Children;Family members;Friends/neighbors   Assistance Needed Patient is A&O X3, on room air at baseline but uses a CPAP at HS, is independent with ADLs and uses no assistive devices at home. Patient is able to transport self to appointments. Patient denies further needs upon discharge.   Type of Residence Private residence   Do you have animals or pets at home? No   Who is requesting discharge planning? Provider   Home or Post Acute Services None   Expected Discharge Disposition Home   Does the patient need discharge transport arranged? No   Financial Resource Strain   How hard is it for you to pay for the very basics like food, housing, medical care, and heating? Not hard   Housing Stability   In the last 12 months, was there a time when you were not able to pay the mortgage or rent on time? N   At any time in the past 12 months, were you homeless or living in a shelter (including now)? N   Transportation Needs   In the past 12 months, has lack of transportation kept you from medical appointments or from getting medications? no   In the past 12 months, has lack of transportation kept you from meetings, work, or from getting things needed for daily living? No   Stroke Family Assessment   Stroke Family Assessment Needed No   Intensity of Service   Intensity of Service 0-30 min

## 2025-07-02 NOTE — PROGRESS NOTES
07/02/25 1000   Referral Data   Referral Source Self referral   Referral Reason Follow up   Referral To   Community Resources Other (Comment)  (SW following for self-pay status; SW located insurance card in system and sent to registration to confirm.  Pt does have active coverage & system has been updated.)

## 2025-07-02 NOTE — PROGRESS NOTES
"BARIATRIC SURGERY PROGRESS NOTE    PATIENT NAME: Lupe Schmidt  MRN: [id number]  DATE: 7/2/2025    SUMMARY OF CURRENT STATUS  - POD # 1 s/p Sleeve Gastrectomy  - esophagram shows no leak or obstruction  - tolerating clear liquids and protein shakes  - denies any major abdominal pain, nausea, SOB, chest pain, leg pain    TODAY'S ASSESSMENT:  - New complications over the past 24 hours: No  - No concerns overnight  - Pain controlled: Yes  - DVT prophylaxis:  Yes - Lovenox and SCDs  - Diet is currently: Phase 1  - Nausea:  No  - Emesis:  No  - Pt has sat in the chair / ambulated    ON EXAMINATION:  /80 (BP Location: Left arm, Patient Position: Sitting)   Pulse 65   Temp 36.4 °C (97.5 °F) (Temporal)   Resp 16   Ht 1.6 m (5' 3\")   Wt 101 kg (221 lb 9.6 oz)   SpO2 94%   BMI 39.25 kg/m²   APPEARANCE: NAD, looks well.  ABDOMEN:  Soft, teofilo-incisional tenderness, nondistended  WOUND(S):  Incisions Clean / Dry / Intact    INS/OUTS:    Intake/Output Summary (Last 24 hours) at 7/2/2025 1215  Last data filed at 7/2/2025 0439  Gross per 24 hour   Intake 2140 ml   Output 580 ml   Net 1560 ml        BLOOD WORK:  Results for orders placed or performed during the hospital encounter of 07/01/25 (from the past 24 hours)   Comprehensive metabolic panel   Result Value Ref Range    Glucose 144 (H) 74 - 99 mg/dL    Sodium 137 136 - 145 mmol/L    Potassium 4.6 3.5 - 5.3 mmol/L    Chloride 106 98 - 107 mmol/L    Bicarbonate 22 21 - 32 mmol/L    Anion Gap 14 10 - 20 mmol/L    Urea Nitrogen 13 6 - 23 mg/dL    Creatinine 0.81 0.50 - 1.05 mg/dL    eGFR 85 >60 mL/min/1.73m*2    Calcium 8.2 (L) 8.6 - 10.3 mg/dL    Albumin 3.5 3.4 - 5.0 g/dL    Alkaline Phosphatase 43 33 - 110 U/L    Total Protein 6.0 (L) 6.4 - 8.2 g/dL    AST 18 9 - 39 U/L    Bilirubin, Total 0.4 0.0 - 1.2 mg/dL    ALT 16 7 - 45 U/L   CBC and Auto Differential   Result Value Ref Range    WBC 7.0 4.4 - 11.3 x10*3/uL    nRBC 0.0 0.0 - 0.0 /100 WBCs    RBC 4.10 4.00 - " 5.20 x10*6/uL    Hemoglobin 12.8 12.0 - 16.0 g/dL    Hematocrit 38.5 36.0 - 46.0 %    MCV 94 80 - 100 fL    MCH 31.2 26.0 - 34.0 pg    MCHC 33.2 32.0 - 36.0 g/dL    RDW 12.6 11.5 - 14.5 %    Platelets 214 150 - 450 x10*3/uL    Neutrophils % 91.6 40.0 - 80.0 %    Immature Granulocytes %, Automated 0.6 0.0 - 0.9 %    Lymphocytes % 6.3 13.0 - 44.0 %    Monocytes % 1.4 2.0 - 10.0 %    Eosinophils % 0.0 0.0 - 6.0 %    Basophils % 0.1 0.0 - 2.0 %    Neutrophils Absolute 6.43 1.20 - 7.70 x10*3/uL    Immature Granulocytes Absolute, Automated 0.04 0.00 - 0.70 x10*3/uL    Lymphocytes Absolute 0.44 (L) 1.20 - 4.80 x10*3/uL    Monocytes Absolute 0.10 0.10 - 1.00 x10*3/uL    Eosinophils Absolute 0.00 0.00 - 0.70 x10*3/uL    Basophils Absolute 0.01 0.00 - 0.10 x10*3/uL       IMPRESSION:  - Pt is doing well / as expected s/p Bariatric Surgery  - No new issues/concerns    PLAN POD1:  - Esophagram today  - Advance to Bariatric Phase 2 diet following esophagram.  Goal 40 oz prior to discharge home, 64 oz daily of fluids.  - Multimodal pain regimen: scheduled tylenol, scheduled toradol, PRN oxycodone  - Encourage Ambulation / use of incentive spirometry  - VTE prophylaxis - Continue with SCDs and Lovenox  - Disposition - DC home today    José Braden MD  Bariatric and Minimally Invasive General Surgery

## 2025-07-02 NOTE — DISCHARGE SUMMARY
Discharge Diagnosis  Morbid obesity (Multi)    Issues Requiring Follow-Up  Pathology results from surgery    Test Results Pending At Discharge  Pending Labs       Order Current Status    Surgical Pathology Exam In process            Hospital Course   Lupe Schmidt underwent an uncomplicated laparoscopic sleeve gastrectomy 7/1/25  by Dr Braden. she recovered on the regular nursing floor. she was started on a clear liquid diet. On POD#1 she had an esophagram which showed no leak or obstruction. she was then advanced to a full liquid diet. On discharge, her pain was controlled on oral pain medications, ambulating and voiding without difficulty, and she was tolerating an adequate amount of liquids by mouth. she was discharged in good condition on POD#1.      Visit Vitals  /80 (BP Location: Left arm, Patient Position: Sitting)   Pulse 65   Temp 36.4 °C (97.5 °F) (Temporal)   Resp 16     Vitals:    07/02/25 0439   Weight: 101 kg (221 lb 9.6 oz)       Immunization History   Administered Date(s) Administered    COVID-19, mRNA, LNP-S, PF, 30 mcg/0.3 mL dose 03/24/2021, 04/23/2021, 12/19/2021    Tdap vaccine, age 7 year and older (BOOSTRIX, ADACEL) 03/18/2024           Home Medications     Medication List      PAUSE taking these medications     NON FORMULARY; Wait to take this until your doctor or other care   provider tells you to start again.   progesterone 200 mg capsule; Wait to take this until your doctor or   other care provider tells you to start again.; Commonly known as:   Prometrium     START taking these medications     acetaminophen 325 mg tablet; Commonly known as: Tylenol; Take 2 tablets   (650 mg) by mouth every 6 hours.   oxyCODONE 5 mg immediate release tablet; Commonly known as: Roxicodone;   Take 1 tablet (5 mg) by mouth every 6 hours if needed for moderate pain (4   - 6) or severe pain (7 - 10) for up to 6 days.   simethicone 80 mg chewable tablet; Commonly known as: Mylicon; Chew and   swallow 1  tablet (80 mg) every 4 hours if needed for flatulence (gas   pain).     CONTINUE taking these medications     multivitamin tablet   NON FORMULARY   omeprazole 40 mg DR capsule; Commonly known as: PriLOSEC; Take 1 capsule   (40 mg) by mouth once daily in the morning. Take before meals. Do not   crush or chew. Open capsule, sprinkle beads on SF jello, pudding or   applesauce.   ondansetron ODT 4 mg disintegrating tablet; Commonly known as:   Zofran-ODT; Dissolve 1 tablet (4 mg) in the mouth every 6 hours if needed   for nausea or vomiting.       Outpatient Follow-Up  Future Appointments   Date Time Provider Department Center   7/8/2025  8:30 AM Sarah Gonzalez RD, CHAIM [id number] [facility]   7/18/2025  9:15 AM MD Luis F [id number] [facility]   8/12/2025  9:00 AM Sarah Gonzalez RD, CHAIM [id number] [facility]   8/15/2025  9:15 AM MD Luis F [id number] [facility]   9/30/2025  9:00 AM Sarah Gonzalez RD, CHAIM [id number] [facility]   10/1/2025 12:45 PM ARIS Larios-CNP [id number] Hector Braden MD

## 2025-07-03 ENCOUNTER — TELEPHONE (OUTPATIENT)
Dept: SURGERY | Facility: CLINIC | Age: 60
End: 2025-07-03
Payer: COMMERCIAL

## 2025-07-03 NOTE — TELEPHONE ENCOUNTER
RN received a message from [facility] that patient was feeling lightheaded and dizzy with shoulder pain. Patient stated that she feel better today than yesterday and that she believes it is just from being in the hospital and then at home. Patient stated shoulder pain could be from the position she was laying. RN educated patient about referred pain from gas being put into belly. RN educated patient to continue to move shoulder to help in relieving the gas. Patient verbalized understanding. RN stated to call if anything changes.

## 2025-07-07 ENCOUNTER — TELEPHONE (OUTPATIENT)
Dept: SURGERY | Facility: CLINIC | Age: 60
End: 2025-07-07
Payer: COMMERCIAL

## 2025-07-07 DIAGNOSIS — M62.830 MUSCLE SPASM OF BACK: Primary | ICD-10-CM

## 2025-07-07 RX ORDER — METHOCARBAMOL 500 MG/1
500 TABLET, FILM COATED ORAL EVERY 6 HOURS PRN
Qty: 20 TABLET | Refills: 0 | Status: SHIPPED | OUTPATIENT
Start: 2025-07-07 | End: 2025-07-12

## 2025-07-07 NOTE — TELEPHONE ENCOUNTER
This RN received a message from Dr Ha that patient had called Dr Bowden's answering service. Patient was complaining of back spasms. Dr Ha did send in a muscle relaxer for patient to try. Patient is going to try that and if symptoms do not improve call RN back.

## 2025-07-08 ENCOUNTER — APPOINTMENT (OUTPATIENT)
Dept: SURGERY | Facility: CLINIC | Age: 60
End: 2025-07-08
Payer: COMMERCIAL

## 2025-07-08 VITALS — BODY MASS INDEX: 36.86 KG/M2 | WEIGHT: 208 LBS | HEIGHT: 63 IN

## 2025-07-08 DIAGNOSIS — E66.812 OBESITY, CLASS II, BMI 35-39.9: ICD-10-CM

## 2025-07-08 DIAGNOSIS — Z98.84 S/P LAPAROSCOPIC SLEEVE GASTRECTOMY: Primary | ICD-10-CM

## 2025-07-11 ENCOUNTER — TELEPHONE (OUTPATIENT)
Dept: SURGERY | Facility: CLINIC | Age: 60
End: 2025-07-11
Payer: COMMERCIAL

## 2025-07-11 RX ORDER — IBUPROFEN 200 MG
1 CAPSULE ORAL DAILY
COMMUNITY

## 2025-07-11 NOTE — TELEPHONE ENCOUNTER
Thank you for speaking with me earlier today & confirming your scheduled visit with Dr. Braden on 7/18/25 at 9:15 am at [facility] (inside [facility], main floor in the [facility]).   Parking is available at a cost of $5.00 at the Main Entrance.  We look forward to seeing you!  Polina Geronimo RN, Clinic Nurse

## 2025-07-18 ENCOUNTER — APPOINTMENT (OUTPATIENT)
Dept: SURGERY | Facility: CLINIC | Age: 60
End: 2025-07-18
Payer: COMMERCIAL

## 2025-07-18 VITALS
WEIGHT: 207.9 LBS | HEART RATE: 81 BPM | OXYGEN SATURATION: 95 % | DIASTOLIC BLOOD PRESSURE: 76 MMHG | BODY MASS INDEX: 36.84 KG/M2 | SYSTOLIC BLOOD PRESSURE: 112 MMHG | HEIGHT: 63 IN

## 2025-07-18 DIAGNOSIS — E03.8 SUBCLINICAL HYPOTHYROIDISM: ICD-10-CM

## 2025-07-18 DIAGNOSIS — K91.2 INTESTINAL MALABSORPTION FOLLOWING GASTRECTOMY (HHS-HCC): ICD-10-CM

## 2025-07-18 DIAGNOSIS — E78.49 OTHER HYPERLIPIDEMIA: ICD-10-CM

## 2025-07-18 DIAGNOSIS — Z90.3 INTESTINAL MALABSORPTION FOLLOWING GASTRECTOMY (HHS-HCC): ICD-10-CM

## 2025-07-18 DIAGNOSIS — E66.01 MORBID OBESITY (MULTI): ICD-10-CM

## 2025-07-18 DIAGNOSIS — G89.29 CHRONIC JOINT PAIN: ICD-10-CM

## 2025-07-18 DIAGNOSIS — M25.50 CHRONIC JOINT PAIN: ICD-10-CM

## 2025-07-18 DIAGNOSIS — Z98.84 S/P LAPAROSCOPIC SLEEVE GASTRECTOMY: Primary | ICD-10-CM

## 2025-07-18 PROCEDURE — 99024 POSTOP FOLLOW-UP VISIT: CPT | Performed by: SURGERY

## 2025-07-18 PROCEDURE — 3008F BODY MASS INDEX DOCD: CPT | Performed by: SURGERY

## 2025-07-18 PROCEDURE — 1036F TOBACCO NON-USER: CPT | Performed by: SURGERY

## 2025-07-18 NOTE — PROGRESS NOTES
BARIATRIC SURGERY CLINIC  FOLLOW UP NOTE      Name: Lupe Schmidt  MRN: [id number]      Index Surgery  Date of Surgery: 7/1/25   Surgeon: José Braden MD  Surgical Procedure: Laparoscopic sleeve gastrectomy  [id number]  Initial weight: 235 lbs  Pre-surgical weight: 229 lbs      Other Bariatric Surgeries  none    Visit: 2   weeks  Today's Visit:   Wt Readings from Last 1 Encounters:   07/18/25 94.3 kg (207 lb 14.4 oz)    Body mass index is 36.83 kg/m².   Last Visit:    Wt Readings from Last 3 Encounters:   07/18/25 94.3 kg (207 lb 14.4 oz)   07/08/25 94.3 kg (208 lb)   07/02/25 101 kg (221 lb 9.6 oz)       Total weight loss: 22 lbs    HPI: she thinks she was having acid reflux after surgery from the pureed foods (if they weren't watery enough). She started taking omeprazole again and it went away.     DIET INTAKE: Pureed    Diet History:     Fluid intake: struggling with adequate water - 45-50  oz/day      DAILY SUPPLEMENTS:  Calcium: Calcium Citrate w/ vitamin D (1200 - 1500mg)  Multivitamin & Minerals: 1 per day - bariatric combo vitamin  Iron Supplement: included in multi-vitamin  Vitamin B12: included in multi-vitamin  Vitamin D3: included in multi-vitamin  Other: none  PPI:omeprazole 40 mg daily    EXERCISE: walking    Symptoms:     Nausea/vomiting: denies   Food intolerance: denies   Constipation: denies   Diarrhea: denies   Experiencing dumping: denies   Abdominal pain: denies   Reflux: yes Are you on medications: better with omeprazole      Current Medications[1]    Comorbidities:  No problem-specific Assessment & Plan notes found for this encounter.        REVIEW OF SYSTEMS:  CONSTITUTIONAL: Patient denies fevers, chills, sweats and weight changes.  EYES: Patient denies any visual symptoms.  EARS, NOSE, AND THROAT: No difficulties with hearing. No symptoms of rhinitis or sore throat.  CARDIOVASCULAR: Patient denies chest pains, palpitations, orthopnea and paroxysmal nocturnal dyspnea.  RESPIRATORY: No dyspnea  "on exertion, no wheezing or cough.  GI: No nausea, vomiting, diarrhea, constipation, abdominal pain, hematochezia or melena.  : No urinary hesitancy or dribbling. No nocturia or urinary frequency. No abnormal urethral discharge.  MUSCULOSKELETAL: No myalgias or arthralgias.  NEUROLOGIC: No chronic headaches, no seizures. Patient denies numbness, tingling or weakness.  PSYCHIATRIC: Patient denies problems with mood disturbance. No problems with anxiety.  ENDOCRINE: No excessive urination or excessive thirst.  DERMATOLOGIC: Patient denies any rashes or skin changes.    PHYSICAL EXAM:  /76 (BP Location: Right arm, Patient Position: Sitting, BP Cuff Size: Large adult long)   Pulse 81   Ht 1.6 m (5' 3\")   Wt 94.3 kg (207 lb 14.4 oz)   SpO2 95%   BMI 36.83 kg/m²   GENERAL: Obese. No apparent distress. Pt is alert and oriented x3.  HEENT: Head is normocephalic and atraumatic. Extraocular muscles are intact. Pupils are equal, round, and reactive to light and accommodation. Nares appeared normal. Mouth is well hydrated and without lesions. Mucous membranes are moist. Posterior pharynx clear of any exudate or lesions.  NECK: Supple. No carotid bruits. No lymphadenopathy or thyromegaly.  LUNGS: Clear to auscultation.  HEART: Regular rate and rhythm without murmur.  ABDOMEN: Soft, nontender, and nondistended. Positive bowel sounds. No hepatosplenomegaly was noted.  EXTREMITIES: Without any cyanosis, clubbing, rash, lesions or edema.  NEUROLOGIC: Cranial nerves II through XII are grossly intact.  PSYCHIATRIC: Flat affect, but denies suicidal or homicidal ideations.  SKIN: No ulceration or induration present.      A/P: Normal post-OP course    Doing well, Excellent Pain control, and tolerating diet    Energy: Energy good    Weight loss: Steady    Recommendations: counseled on exercise, No heavy lifting > 10 lbs for: 2 more weeks, Fluid intake 60 oz/day, Protein 60 g/day, counseled on diet: discussed ideas for " increasing protein on pureed diet, Plan your meals, Do not skip meals, and Continue taking vitamins as directed.    Follow up: 4 weeks    José Braden MD  Bariatric and Minimally Invasive General Surgery                     [1]   Current Outpatient Medications   Medication Sig Dispense Refill    acetaminophen (Tylenol) 325 mg tablet Take 2 tablets (650 mg) by mouth every 6 hours.      calcium citrate (Calcitrate) 950 mg (200 mg elemental) tablet Take 1 tablet by mouth once daily.      multivitamin tablet Take 1 tablet by mouth once daily.      methocarbamol (Robaxin) 500 mg tablet Take 1 tablet (500 mg) by mouth every 6 hours if needed for muscle spasms for up to 5 days. (Patient not taking: Reported on 7/11/2025) 20 tablet 0    NON FORMULARY Insert 1 each into the vagina once daily. DHEA, ESTRADIOL, PROGESTERONE CREAM (Patient not taking: Reported on 7/11/2025)      [Paused] NON FORMULARY Inject 1 each under the skin 1 (one) time per week. Pt states she takes a GLP-1 compound from [facility] once weekly on Saturdays. Current dose is 18 units    Last dose 6/12/25      omeprazole (PriLOSEC) 40 mg DR capsule Take 1 capsule (40 mg) by mouth once daily in the morning. Take before meals. Do not crush or chew. Open capsule, sprinkle beads on SF jello, pudding or applesauce. (Patient not taking: Reported on 7/11/2025) 30 capsule 5    ondansetron ODT (Zofran-ODT) 4 mg disintegrating tablet Dissolve 1 tablet (4 mg) in the mouth every 6 hours if needed for nausea or vomiting. (Patient not taking: Reported on 7/11/2025) 15 tablet 1    [Paused] progesterone (Prometrium) 200 mg capsule Take 1 capsule (200 mg) by mouth once daily.      simethicone (Mylicon) 80 mg chewable tablet Chew and swallow 1 tablet (80 mg) every 4 hours if needed for flatulence (gas pain).       No current facility-administered medications for this visit.

## 2025-08-05 NOTE — PROGRESS NOTES
"Follow Up Bariatric Nutrition Assessment    Name: Lupe Schmidt  MRN: [id number]  Date: 08/07/25     Surgery Date: 7/01/25  Surgeon: Asiya  Procedure: sleeve gastrectomy    ASSESSMENT:  Current weight:     Vitals:    08/07/25 1143   Weight: 91.6 kg (202 lb)                  Ht:  1.6 m (5' 3\")      BMI:  Body mass index is 35.78 kg/m².    Previous weight:   208lbs  Initial start weight:   237lbs  EBW:  96lbs  Total weight change:  35lbs  %EBW Lost: 36.5%    PROGRESS:  Nutrition Interventions for last encounter   Continue to drink your protein shakes to meet your goal of 60-70 g of protein per day. Begin measuring how much protein you can eat on the soft diet so that you know when to start weaning off of your protein shakes.   Continue to drink 64 oz. of zero calorie beverages per day  Continue no drinking 30 min before, during the meal and for 30 minutes after the meal  Continue to exercise  Advance to the puree diet for 1 week, then soft food for 3 weeks  Remember to eat slowly and chew thoroughly  Try one new food at a time to test for any intolerances.   Continue to take all of your vitamins and minerals.     CHANGES IN TREATMENT:   Patient met goals:   Partially      24 hour food recall:   Breakfast: Fairlife shake or self-made shake (aaron, okra, cashew milk, Trim Healthy mama protein powder (25-30g), MCT oil, collagen, tumric)   Snack:   Lunch: ground meat (3-4oz) w/veggies   Snack:     Dinner: grilled nuggets (3-4oz)  Snack:   Beverages:  water and CL (55oz)   Alcohol: no       Vitamins:   Bariatric Pal MVI, 1500 mg Calcium (Bariatric Pal)  Medications: Current Medications[1]    Physical Activity: yard work and walking inconsistently     READINESS TO LEARN:  Motivation to learn:  Interested      Understanding of instruction: Good      Anticipated Compliance: Good       Family Support: Unable to assess-family not present     Patient presents with post-op weight loss surgery sleeve gastrectomy.. The pt is 6 " weeks postop.   Patient has lost 35 pounds since initial assessment accounting for 36.5% loss excess body weight.  Tolerating soft diet without difficulty.  Protein intake is adequate for post-op individual. Fluid consumption is adequate. Patient is supplementing recommended vitamin/minerals. Pt states no concerns/difficulties. Would like to start going to the gym in a couple of weeks.   Discussed the transition diet. Reminded pt to eat slowly, chew thoroughly and to try on new food at a time.     Malnutrition Screening  Significant unintentional weight loss? n/a  Eating less than 75% of usual intake for more than 2 weeks? n/a    Nutrition Diagnosis:   Increased protein and nutrition needs related to altered GI function as evidenced by pt. s/p sleeve gastrectomy.  Food- and nutrition-related knowledge deficit related to lack of prior exposure to surgical weight loss information as evidenced by diet recall.     Nutrition Interventions:   Modify type and amount of food and nutrients within meals and snacks.  Comprehensive Nutrition Education  -Nutrition education materials: none      Recommendations:    Eat 60-70 g of protein per day  Drink 64 oz. of zero calorie beverages per day  Continue no drinking 30 min before, during the meal and for 30 minutes after the meal  Begin set exercise.   Advance to transition diet. Try raw veggies, fresh fruit and lean ground beef.   Eat slowly, chew thoroughly  Try one new food at a time.   Continue current vit/min regimen    Nutrition Monitoring and Evaluation:   1-2 pounds weight loss per week  Criteria: weight check, food recall  Need for Follow-up: 3 months    Reva Rowan MS, RD, LD  Phone: [phone]         [1]   Current Outpatient Medications:     acetaminophen (Tylenol) 325 mg tablet, Take 2 tablets (650 mg) by mouth every 6 hours., Disp: , Rfl:     calcium citrate (Calcitrate) 950 mg (200 mg elemental) tablet, Take 1 tablet by mouth once daily., Disp: , Rfl:      methocarbamol (Robaxin) 500 mg tablet, Take 1 tablet (500 mg) by mouth every 6 hours if needed for muscle spasms for up to 5 days. (Patient not taking: Reported on 7/11/2025), Disp: 20 tablet, Rfl: 0    multivitamin tablet, Take 1 tablet by mouth once daily., Disp: , Rfl:     NON FORMULARY, Insert 1 each into the vagina once daily. DHEA, ESTRADIOL, PROGESTERONE CREAM (Patient not taking: Reported on 7/11/2025), Disp: , Rfl:     omeprazole (PriLOSEC) 40 mg DR capsule, Take 1 capsule (40 mg) by mouth once daily in the morning. Take before meals. Do not crush or chew. Open capsule, sprinkle beads on SF jello, pudding or applesauce. (Patient not taking: Reported on 7/11/2025), Disp: 30 capsule, Rfl: 5    ondansetron ODT (Zofran-ODT) 4 mg disintegrating tablet, Dissolve 1 tablet (4 mg) in the mouth every 6 hours if needed for nausea or vomiting. (Patient not taking: Reported on 7/11/2025), Disp: 15 tablet, Rfl: 1    [Paused] progesterone (Prometrium) 200 mg capsule, Take 1 capsule (200 mg) by mouth once daily., Disp: , Rfl:     simethicone (Mylicon) 80 mg chewable tablet, Chew and swallow 1 tablet (80 mg) every 4 hours if needed for flatulence (gas pain)., Disp: , Rfl:

## 2025-08-07 ENCOUNTER — APPOINTMENT (OUTPATIENT)
Dept: SURGERY | Facility: CLINIC | Age: 60
End: 2025-08-07
Payer: COMMERCIAL

## 2025-08-07 VITALS — HEIGHT: 63 IN | BODY MASS INDEX: 35.79 KG/M2 | WEIGHT: 202 LBS

## 2025-08-12 ENCOUNTER — APPOINTMENT (OUTPATIENT)
Dept: SURGERY | Facility: CLINIC | Age: 60
End: 2025-08-12
Payer: COMMERCIAL

## 2025-08-15 ENCOUNTER — APPOINTMENT (OUTPATIENT)
Dept: SURGERY | Facility: CLINIC | Age: 60
End: 2025-08-15
Payer: COMMERCIAL

## 2025-08-15 VITALS
SYSTOLIC BLOOD PRESSURE: 129 MMHG | WEIGHT: 202.7 LBS | HEART RATE: 75 BPM | BODY MASS INDEX: 35.91 KG/M2 | HEIGHT: 63 IN | OXYGEN SATURATION: 99 % | DIASTOLIC BLOOD PRESSURE: 80 MMHG | RESPIRATION RATE: 18 BRPM

## 2025-08-15 DIAGNOSIS — G89.29 CHRONIC JOINT PAIN: ICD-10-CM

## 2025-08-15 DIAGNOSIS — E78.49 OTHER HYPERLIPIDEMIA: ICD-10-CM

## 2025-08-15 DIAGNOSIS — Z13.21 ENCOUNTER FOR VITAMIN DEFICIENCY SCREENING: ICD-10-CM

## 2025-08-15 DIAGNOSIS — E66.812 OBESITY, CLASS II, BMI 35-39.9: ICD-10-CM

## 2025-08-15 DIAGNOSIS — K91.2 INTESTINAL MALABSORPTION FOLLOWING GASTRECTOMY (HHS-HCC): Primary | ICD-10-CM

## 2025-08-15 DIAGNOSIS — Z98.84 S/P LAPAROSCOPIC SLEEVE GASTRECTOMY: ICD-10-CM

## 2025-08-15 DIAGNOSIS — Z98.84 STATUS POST BARIATRIC SURGERY: ICD-10-CM

## 2025-08-15 DIAGNOSIS — B37.2 SKIN YEAST INFECTION: ICD-10-CM

## 2025-08-15 DIAGNOSIS — Z98.84 BARIATRIC SURGERY STATUS: ICD-10-CM

## 2025-08-15 DIAGNOSIS — E03.8 SUBCLINICAL HYPOTHYROIDISM: ICD-10-CM

## 2025-08-15 DIAGNOSIS — Z90.3 INTESTINAL MALABSORPTION FOLLOWING GASTRECTOMY (HHS-HCC): Primary | ICD-10-CM

## 2025-08-15 DIAGNOSIS — M25.50 CHRONIC JOINT PAIN: ICD-10-CM

## 2025-08-15 PROCEDURE — 99024 POSTOP FOLLOW-UP VISIT: CPT | Performed by: SURGERY

## 2025-08-15 PROCEDURE — 1036F TOBACCO NON-USER: CPT | Performed by: SURGERY

## 2025-08-15 PROCEDURE — 3008F BODY MASS INDEX DOCD: CPT | Performed by: SURGERY

## 2025-08-15 RX ORDER — NYSTATIN 100000 [USP'U]/G
1 POWDER TOPICAL 2 TIMES DAILY
Qty: 30 G | Refills: 2 | Status: SHIPPED | OUTPATIENT
Start: 2025-08-15

## 2025-08-15 ASSESSMENT — PAIN SCALES - GENERAL: PAINLEVEL_OUTOF10: 0-NO PAIN

## 2025-09-30 ENCOUNTER — APPOINTMENT (OUTPATIENT)
Dept: SURGERY | Facility: CLINIC | Age: 60
End: 2025-09-30
Payer: COMMERCIAL

## 2025-10-15 ENCOUNTER — APPOINTMENT (OUTPATIENT)
Dept: SURGERY | Facility: CLINIC | Age: 60
End: 2025-10-15
Payer: COMMERCIAL

## 2025-10-23 ENCOUNTER — APPOINTMENT (OUTPATIENT)
Dept: OTOLARYNGOLOGY | Facility: CLINIC | Age: 60
End: 2025-10-23
Payer: COMMERCIAL

## 2025-10-23 ENCOUNTER — APPOINTMENT (OUTPATIENT)
Dept: AUDIOLOGY | Facility: CLINIC | Age: 60
End: 2025-10-23
Payer: COMMERCIAL

## (undated) DEVICE — CLEAN KIT, ANTIFOG SCOPE, SEE SHARP 150MM

## (undated) DEVICE — TUBE SET, PNEUMOCLEAR, SMOKE EVACU, HIGH-FLOW

## (undated) DEVICE — DRAPE, INSTRUMENT, W/POUCH, STERI DRAPE, 9 5/8 X 18 LONG

## (undated) DEVICE — NEEDLE, SPINAL, QUINCKE, 18 G X 3.5 IN, PINK HUB

## (undated) DEVICE — SUTURE, MONOCRYL, 4-0, 18 IN, PS2, UNDYED

## (undated) DEVICE — SYSTEM, FIOS FIRST ENTRY, 5 X 100MM, KII ADVANCED FIXATION

## (undated) DEVICE — SYSTEM, GASTRECTOMY SLEEVE, 36FR W/O BULB, VISIGI 3D

## (undated) DEVICE — NEEDLE, INSUFFLATION, 13GAX120MM, DISP

## (undated) DEVICE — ADHESIVE, SKIN, DERMABOND ADVANCED, 15CM, PEN-STYLE

## (undated) DEVICE — APPLICATOR, CHLORAPREP, W/ORANGE TINT, 26ML

## (undated) DEVICE — STAPLER, LINEAR ENDO RELOAD, 60MM, BLUE, DISP

## (undated) DEVICE — CAUTERY, PENCIL, PUSH BUTTON, SMOKE EVAC, 70MM

## (undated) DEVICE — ACCESS SYS, KII SHIELDED BLADED, Z-THREAD, 5X100CM

## (undated) DEVICE — BAG, DECANTER

## (undated) DEVICE — SUTURE, VICRYL, 0, 27 IN, UR-6, VIOLET

## (undated) DEVICE — STAPLER,  LINEAR ENDO 60MM RELOAD, GREEN, DISP

## (undated) DEVICE — Device

## (undated) DEVICE — ASSEMBLY, STRYKER FLOW 2, SUCTION IRRIGATOR, WITH TIP

## (undated) DEVICE — SUTURE, STRATAFIX, SPIRAL, 2-0 SH, PDS PLUS VIOLET

## (undated) DEVICE — STAPLELINE, BIOABSORB, SEAMGUARD, 60

## (undated) DEVICE — TROCAR, BLADELESS, THREADED, 15MM

## (undated) DEVICE — SOLUTION, IV 0.9% NACL INJECTION USP 1000ML EXCEL PLUS, BAG

## (undated) DEVICE — COVER, PLASTIC, MAYO STAND, 29.5IN X 55.5IN

## (undated) DEVICE — SYRINGE, 50 CC, LUER LOCK

## (undated) DEVICE — STAPLER,  LINEAR RELOAD, 60MM, WHITE, DISP

## (undated) DEVICE — SCISSOR, MINI ENDO CUT, TIPS, DISP